# Patient Record
Sex: FEMALE | Race: WHITE | NOT HISPANIC OR LATINO | Employment: UNEMPLOYED | URBAN - METROPOLITAN AREA
[De-identification: names, ages, dates, MRNs, and addresses within clinical notes are randomized per-mention and may not be internally consistent; named-entity substitution may affect disease eponyms.]

---

## 2018-02-14 ENCOUNTER — TRANSCRIBE ORDERS (OUTPATIENT)
Dept: ADMINISTRATIVE | Facility: HOSPITAL | Age: 43
End: 2018-02-14

## 2018-02-14 DIAGNOSIS — Z12.31 VISIT FOR SCREENING MAMMOGRAM: Primary | ICD-10-CM

## 2018-02-16 ENCOUNTER — HOSPITAL ENCOUNTER (OUTPATIENT)
Dept: MAMMOGRAPHY | Facility: HOSPITAL | Age: 43
Discharge: HOME | End: 2018-02-16
Admitting: OBSTETRICS & GYNECOLOGY

## 2018-02-16 DIAGNOSIS — Z12.31 VISIT FOR SCREENING MAMMOGRAM: ICD-10-CM

## 2018-02-16 PROCEDURE — 77063 BREAST TOMOSYNTHESIS BI: CPT

## 2018-02-16 PROCEDURE — 77063 BREAST TOMOSYNTHESIS BI: CPT | Performed by: RADIOLOGY

## 2018-02-16 PROCEDURE — 77067 SCR MAMMO BI INCL CAD: CPT | Performed by: RADIOLOGY

## 2018-02-16 PROCEDURE — 77067 SCR MAMMO BI INCL CAD: CPT

## 2019-03-20 ENCOUNTER — TRANSCRIBE ORDERS (OUTPATIENT)
Dept: ADMINISTRATIVE | Facility: HOSPITAL | Age: 44
End: 2019-03-20

## 2019-03-20 DIAGNOSIS — Z12.31 VISIT FOR SCREENING MAMMOGRAM: Primary | ICD-10-CM

## 2019-06-13 ENCOUNTER — APPOINTMENT (OUTPATIENT)
Dept: MAMMOGRAPHY | Facility: HOSPITAL | Age: 44
End: 2019-06-13

## 2019-07-02 ENCOUNTER — HOSPITAL ENCOUNTER (OUTPATIENT)
Dept: MAMMOGRAPHY | Facility: HOSPITAL | Age: 44
Discharge: HOME | End: 2019-07-02
Admitting: OBSTETRICS & GYNECOLOGY

## 2019-07-02 DIAGNOSIS — Z12.31 VISIT FOR SCREENING MAMMOGRAM: ICD-10-CM

## 2019-07-02 PROCEDURE — 77067 SCR MAMMO BI INCL CAD: CPT

## 2019-07-02 PROCEDURE — 77063 BREAST TOMOSYNTHESIS BI: CPT | Performed by: RADIOLOGY

## 2019-07-02 PROCEDURE — 77067 SCR MAMMO BI INCL CAD: CPT | Performed by: RADIOLOGY

## 2019-07-02 PROCEDURE — 77063 BREAST TOMOSYNTHESIS BI: CPT

## 2019-07-10 ENCOUNTER — HOSPITAL ENCOUNTER (OUTPATIENT)
Dept: ULTRASOUND IMAGING | Facility: HOSPITAL | Age: 44
Discharge: HOME | End: 2019-07-10

## 2019-07-10 ENCOUNTER — HOSPITAL ENCOUNTER (OUTPATIENT)
Dept: MAMMOGRAPHY | Facility: HOSPITAL | Age: 44
Discharge: HOME | End: 2019-07-10

## 2019-07-10 ENCOUNTER — HOSPITAL ENCOUNTER (OUTPATIENT)
Dept: MAMMOGRAPHY | Facility: HOSPITAL | Age: 44
Discharge: HOME | End: 2019-07-10
Admitting: RADIOLOGY

## 2019-07-10 DIAGNOSIS — R92.8 ABNORMAL MAMMOGRAM: ICD-10-CM

## 2019-07-10 PROCEDURE — 19000 PUNCTURE ASPIR CYST BREAST: CPT | Performed by: RADIOLOGY

## 2019-07-10 PROCEDURE — A4648 IMPLANTABLE TISSUE MARKER: HCPCS

## 2019-07-10 PROCEDURE — G0279 TOMOSYNTHESIS, MAMMO: HCPCS

## 2019-07-10 PROCEDURE — 88360 TUMOR IMMUNOHISTOCHEM/MANUAL: CPT | Performed by: OBSTETRICS & GYNECOLOGY

## 2019-07-10 PROCEDURE — 76642 ULTRASOUND BREAST LIMITED: CPT | Performed by: RADIOLOGY

## 2019-07-10 PROCEDURE — 76942 ECHO GUIDE FOR BIOPSY: CPT | Performed by: RADIOLOGY

## 2019-07-10 PROCEDURE — 76942 ECHO GUIDE FOR BIOPSY: CPT

## 2019-07-10 PROCEDURE — 77065 DX MAMMO INCL CAD UNI: CPT

## 2019-07-10 PROCEDURE — 88305 TISSUE EXAM BY PATHOLOGIST: CPT | Performed by: OBSTETRICS & GYNECOLOGY

## 2019-07-10 PROCEDURE — 25010000003 LIDOCAINE 1 % SOLUTION: Performed by: RADIOLOGY

## 2019-07-10 PROCEDURE — 19083 BX BREAST 1ST LESION US IMAG: CPT | Performed by: RADIOLOGY

## 2019-07-10 PROCEDURE — 77061 BREAST TOMOSYNTHESIS UNI: CPT | Performed by: RADIOLOGY

## 2019-07-10 PROCEDURE — 88173 CYTOPATH EVAL FNA REPORT: CPT | Performed by: OBSTETRICS & GYNECOLOGY

## 2019-07-10 PROCEDURE — 76642 ULTRASOUND BREAST LIMITED: CPT

## 2019-07-10 PROCEDURE — 77065 DX MAMMO INCL CAD UNI: CPT | Performed by: RADIOLOGY

## 2019-07-10 RX ORDER — LIDOCAINE HYDROCHLORIDE 10 MG/ML
5 INJECTION, SOLUTION INFILTRATION; PERINEURAL ONCE
Status: COMPLETED | OUTPATIENT
Start: 2019-07-10 | End: 2019-07-10

## 2019-07-10 RX ORDER — LIDOCAINE HYDROCHLORIDE AND EPINEPHRINE 10; 10 MG/ML; UG/ML
10 INJECTION, SOLUTION INFILTRATION; PERINEURAL ONCE
Status: COMPLETED | OUTPATIENT
Start: 2019-07-10 | End: 2019-07-10

## 2019-07-10 RX ADMIN — LIDOCAINE HYDROCHLORIDE 4 ML: 10 INJECTION, SOLUTION INFILTRATION; PERINEURAL at 15:04

## 2019-07-10 RX ADMIN — LIDOCAINE HYDROCHLORIDE,EPINEPHRINE BITARTRATE 3 ML: 10; .01 INJECTION, SOLUTION INFILTRATION; PERINEURAL at 14:43

## 2019-07-10 RX ADMIN — LIDOCAINE HYDROCHLORIDE 3 ML: 10 INJECTION, SOLUTION INFILTRATION; PERINEURAL at 14:42

## 2019-07-12 ENCOUNTER — TELEPHONE (OUTPATIENT)
Dept: MAMMOGRAPHY | Facility: HOSPITAL | Age: 44
End: 2019-07-12

## 2019-07-12 LAB
LAB AP CASE REPORT: NORMAL
PATH REPORT.FINAL DX SPEC: NORMAL

## 2019-07-12 NOTE — TELEPHONE ENCOUNTER
Referring provider's office notified pathology returned as cancer & patient will be notified. Pt notified of pathology results and recommendation. Verbalizes understanding. Denies discomfort. Denies signs and symptoms of infection.     Pt desires to research surgeon choice. Pt is to call back with decision; an appointment will then be scheduled and she will be notified. Verbalizes understanding.

## 2019-07-15 ENCOUNTER — TELEPHONE (OUTPATIENT)
Dept: MAMMOGRAPHY | Facility: HOSPITAL | Age: 44
End: 2019-07-15

## 2019-07-15 NOTE — TELEPHONE ENCOUNTER
Returned pt call, requests Dr Rolle for surgical consult. Pt will be notified when an appointment is scheduled.

## 2019-07-15 NOTE — TELEPHONE ENCOUNTER
Returned pt call; notified of surgical consult appointment with Dr. Rolle on 8.14.19 @ [phone]. Told to bring photo ID, insurance cards & list of current medications. Questions answered, support given. Patient was encouraged to call back with any questions or concerns. Patient verbalized understanding.

## 2019-07-15 NOTE — TELEPHONE ENCOUNTER
Pt called in, left message; changed her surgical consult appt with Dr WEEMS to Dr Fitch Page on Monday 7.22.19 @ [phone].

## 2019-07-22 ENCOUNTER — DOCUMENTATION (OUTPATIENT)
Dept: ONCOLOGY | Facility: CLINIC | Age: 44
End: 2019-07-22

## 2019-07-23 ENCOUNTER — DOCUMENTATION (OUTPATIENT)
Dept: ONCOLOGY | Facility: CLINIC | Age: 44
End: 2019-07-23

## 2019-07-23 ENCOUNTER — APPOINTMENT (OUTPATIENT)
Dept: MAMMOGRAPHY | Facility: HOSPITAL | Age: 44
End: 2019-07-23

## 2019-07-23 DIAGNOSIS — Z17.0 MALIGNANT NEOPLASM OF OVERLAPPING SITES OF RIGHT BREAST IN FEMALE, ESTROGEN RECEPTOR POSITIVE (HCC): Primary | ICD-10-CM

## 2019-07-23 DIAGNOSIS — C50.811 MALIGNANT NEOPLASM OF OVERLAPPING SITES OF RIGHT BREAST IN FEMALE, ESTROGEN RECEPTOR POSITIVE (HCC): Primary | ICD-10-CM

## 2019-07-23 NOTE — PROGRESS NOTES
Patient offered genetic counseling. All questions concnerning the reasons and the process were discussed.  She will call back after she discusses this with her . SC

## 2019-07-23 NOTE — PROGRESS NOTES
Saw patient and  with Dr. Hurst. Path report and surgical options were discussed for her clinical stage 1 ER/CA Her 2 freddy negative cancer diagnosis. Gave and reviewed support and educational materials.  Patient will call back with her surgical decision. All questions have been answered and patient will call with any new concerns. SC

## 2019-07-25 ENCOUNTER — DOCUMENTATION (OUTPATIENT)
Dept: ONCOLOGY | Facility: CLINIC | Age: 44
End: 2019-07-25

## 2019-07-25 NOTE — PROGRESS NOTES
Patient called and states she would like to have a double mastectomy without reconstruction. Patient verbalizes understanding that she can have reconstruction at a  later date if she desires. MD office will be notified. Patient will call with any new questions or concerns. SC

## 2019-07-29 ENCOUNTER — TRANSCRIBE ORDERS (OUTPATIENT)
Dept: ADMINISTRATIVE | Facility: HOSPITAL | Age: 44
End: 2019-07-29

## 2019-07-29 ENCOUNTER — DOCUMENTATION (OUTPATIENT)
Dept: ONCOLOGY | Facility: CLINIC | Age: 44
End: 2019-07-29

## 2019-07-29 DIAGNOSIS — C50.811 MALIGNANT NEOPLASM OF OVERLAPPING SITES OF RIGHT FEMALE BREAST, UNSPECIFIED ESTROGEN RECEPTOR STATUS (HCC): Primary | ICD-10-CM

## 2019-07-29 NOTE — PROGRESS NOTES
Patient called and would like to be put on a cancellation list if possible. Her scheduled date for surgery is August 14. Office will be notified. SC

## 2019-08-01 ENCOUNTER — DOCUMENTATION (OUTPATIENT)
Dept: ONCOLOGY | Facility: CLINIC | Age: 44
End: 2019-08-01

## 2019-08-01 NOTE — PROGRESS NOTES
Patient called with questions regarding her upcoming double mastectomy. All questions were answered and she will call back with any new concerns. SC

## 2019-08-13 RX ORDER — CETIRIZINE HYDROCHLORIDE 10 MG/1
10 TABLET ORAL AS NEEDED
COMMUNITY
End: 2021-08-04

## 2019-08-14 ENCOUNTER — HOSPITAL ENCOUNTER (OUTPATIENT)
Dept: NUCLEAR MEDICINE | Facility: HOSPITAL | Age: 44
Discharge: HOME | End: 2019-08-14

## 2019-08-14 ENCOUNTER — HOSPITAL ENCOUNTER (OUTPATIENT)
Facility: HOSPITAL | Age: 44
Discharge: HOME | End: 2019-08-15
Attending: SURGERY | Admitting: SURGERY

## 2019-08-14 ENCOUNTER — ANESTHESIA (OUTPATIENT)
Dept: PERIOP | Facility: HOSPITAL | Age: 44
End: 2019-08-14

## 2019-08-14 ENCOUNTER — ANESTHESIA EVENT (OUTPATIENT)
Dept: PERIOP | Facility: HOSPITAL | Age: 44
End: 2019-08-14

## 2019-08-14 DIAGNOSIS — Z85.3 HISTORY OF RIGHT BREAST CANCER: ICD-10-CM

## 2019-08-14 DIAGNOSIS — C50.811 MALIGNANT NEOPLASM OF OVERLAPPING SITES OF RIGHT FEMALE BREAST, UNSPECIFIED ESTROGEN RECEPTOR STATUS (HCC): ICD-10-CM

## 2019-08-14 LAB
B-HCG UR QL: NEGATIVE
DEPRECATED RDW RBC AUTO: 43.5 FL (ref 37–54)
ERYTHROCYTE [DISTWIDTH] IN BLOOD BY AUTOMATED COUNT: 13.7 % (ref 12.3–15.4)
HCT VFR BLD AUTO: 39.6 % (ref 34–46.6)
HGB BLD-MCNC: 12.8 G/DL (ref 12–15.9)
INTERNAL NEGATIVE CONTROL: NEGATIVE
INTERNAL POSITIVE CONTROL: POSITIVE
Lab: NORMAL
MCH RBC QN AUTO: 28.3 PG (ref 26.6–33)
MCHC RBC AUTO-ENTMCNC: 32.3 G/DL (ref 31.5–35.7)
MCV RBC AUTO: 87.4 FL (ref 79–97)
PLATELET # BLD AUTO: 219 10*3/MM3 (ref 140–450)
PMV BLD AUTO: 10.5 FL (ref 6–12)
RBC # BLD AUTO: 4.53 10*6/MM3 (ref 3.77–5.28)
WBC NRBC COR # BLD: 5.11 10*3/MM3 (ref 3.4–10.8)

## 2019-08-14 PROCEDURE — 88307 TISSUE EXAM BY PATHOLOGIST: CPT | Performed by: SURGERY

## 2019-08-14 PROCEDURE — 25010000002 ONDANSETRON PER 1 MG: Performed by: NURSE ANESTHETIST, CERTIFIED REGISTERED

## 2019-08-14 PROCEDURE — 81025 URINE PREGNANCY TEST: CPT | Performed by: ANESTHESIOLOGY

## 2019-08-14 PROCEDURE — 25010000002 FENTANYL CITRATE (PF) 100 MCG/2ML SOLUTION: Performed by: NURSE ANESTHETIST, CERTIFIED REGISTERED

## 2019-08-14 PROCEDURE — G0378 HOSPITAL OBSERVATION PER HR: HCPCS

## 2019-08-14 PROCEDURE — A9541 TC99M SULFUR COLLOID: HCPCS | Performed by: SURGERY

## 2019-08-14 PROCEDURE — 85027 COMPLETE CBC AUTOMATED: CPT | Performed by: ANESTHESIOLOGY

## 2019-08-14 PROCEDURE — 25010000002 PROPOFOL 1000 MG/ML EMULSION: Performed by: NURSE ANESTHETIST, CERTIFIED REGISTERED

## 2019-08-14 PROCEDURE — 88341 IMHCHEM/IMCYTCHM EA ADD ANTB: CPT | Performed by: SURGERY

## 2019-08-14 PROCEDURE — 0 TECHNETIUM FILTERED SULFUR COLLOID: Performed by: SURGERY

## 2019-08-14 PROCEDURE — 38792 RA TRACER ID OF SENTINL NODE: CPT

## 2019-08-14 PROCEDURE — 25010000002 PROPOFOL 10 MG/ML EMULSION: Performed by: NURSE ANESTHETIST, CERTIFIED REGISTERED

## 2019-08-14 PROCEDURE — 25010000002 BUPRENORPHINE PER 0.1 MG: Performed by: NURSE ANESTHETIST, CERTIFIED REGISTERED

## 2019-08-14 PROCEDURE — 25010000002 DEXAMETHASONE SODIUM PHOSPHATE 10 MG/ML SOLUTION: Performed by: NURSE ANESTHETIST, CERTIFIED REGISTERED

## 2019-08-14 PROCEDURE — 25010000002 MIDAZOLAM PER 1 MG: Performed by: ANESTHESIOLOGY

## 2019-08-14 PROCEDURE — 25010000003 LIDOCAINE 1 % SOLUTION: Performed by: NURSE ANESTHETIST, CERTIFIED REGISTERED

## 2019-08-14 RX ORDER — LIDOCAINE HYDROCHLORIDE 10 MG/ML
INJECTION, SOLUTION INFILTRATION; PERINEURAL AS NEEDED
Status: DISCONTINUED | OUTPATIENT
Start: 2019-08-14 | End: 2019-08-14 | Stop reason: SURG

## 2019-08-14 RX ORDER — GLYCOPYRROLATE 0.2 MG/ML
INJECTION INTRAMUSCULAR; INTRAVENOUS AS NEEDED
Status: DISCONTINUED | OUTPATIENT
Start: 2019-08-14 | End: 2019-08-14 | Stop reason: SURG

## 2019-08-14 RX ORDER — SODIUM CHLORIDE, SODIUM LACTATE, POTASSIUM CHLORIDE, CALCIUM CHLORIDE 600; 310; 30; 20 MG/100ML; MG/100ML; MG/100ML; MG/100ML
9 INJECTION, SOLUTION INTRAVENOUS CONTINUOUS PRN
Status: DISCONTINUED | OUTPATIENT
Start: 2019-08-14 | End: 2019-08-14 | Stop reason: HOSPADM

## 2019-08-14 RX ORDER — BUPRENORPHINE HYDROCHLORIDE 0.32 MG/ML
INJECTION INTRAMUSCULAR; INTRAVENOUS
Status: COMPLETED | OUTPATIENT
Start: 2019-08-14 | End: 2019-08-14

## 2019-08-14 RX ORDER — CETIRIZINE HYDROCHLORIDE 10 MG/1
10 TABLET ORAL DAILY PRN
Status: DISCONTINUED | OUTPATIENT
Start: 2019-08-14 | End: 2019-08-15 | Stop reason: HOSPADM

## 2019-08-14 RX ORDER — DEXAMETHASONE SODIUM PHOSPHATE 10 MG/ML
INJECTION, SOLUTION INTRAMUSCULAR; INTRAVENOUS
Status: COMPLETED | OUTPATIENT
Start: 2019-08-14 | End: 2019-08-14

## 2019-08-14 RX ORDER — BUPIVACAINE HYDROCHLORIDE 2.5 MG/ML
INJECTION, SOLUTION EPIDURAL; INFILTRATION; INTRACAUDAL
Status: COMPLETED | OUTPATIENT
Start: 2019-08-14 | End: 2019-08-14

## 2019-08-14 RX ORDER — FENTANYL CITRATE 50 UG/ML
INJECTION, SOLUTION INTRAMUSCULAR; INTRAVENOUS AS NEEDED
Status: DISCONTINUED | OUTPATIENT
Start: 2019-08-14 | End: 2019-08-14 | Stop reason: SURG

## 2019-08-14 RX ORDER — PROPOFOL 10 MG/ML
VIAL (ML) INTRAVENOUS AS NEEDED
Status: DISCONTINUED | OUTPATIENT
Start: 2019-08-14 | End: 2019-08-14 | Stop reason: SURG

## 2019-08-14 RX ORDER — FENTANYL CITRATE 50 UG/ML
50 INJECTION, SOLUTION INTRAMUSCULAR; INTRAVENOUS
Status: DISCONTINUED | OUTPATIENT
Start: 2019-08-14 | End: 2019-08-14 | Stop reason: HOSPADM

## 2019-08-14 RX ORDER — SODIUM CHLORIDE 0.9 % (FLUSH) 0.9 %
3 SYRINGE (ML) INJECTION EVERY 12 HOURS SCHEDULED
Status: CANCELLED | OUTPATIENT
Start: 2019-08-14

## 2019-08-14 RX ORDER — ONDANSETRON 2 MG/ML
4 INJECTION INTRAMUSCULAR; INTRAVENOUS ONCE AS NEEDED
Status: DISCONTINUED | OUTPATIENT
Start: 2019-08-14 | End: 2019-08-14 | Stop reason: HOSPADM

## 2019-08-14 RX ORDER — MAGNESIUM HYDROXIDE 1200 MG/15ML
LIQUID ORAL AS NEEDED
Status: DISCONTINUED | OUTPATIENT
Start: 2019-08-14 | End: 2019-08-14 | Stop reason: HOSPADM

## 2019-08-14 RX ORDER — TRAMADOL HYDROCHLORIDE 50 MG/1
100 TABLET ORAL EVERY 6 HOURS PRN
Status: DISCONTINUED | OUTPATIENT
Start: 2019-08-14 | End: 2019-08-15 | Stop reason: HOSPADM

## 2019-08-14 RX ORDER — DIPHENHYDRAMINE HYDROCHLORIDE 50 MG/ML
12.5 INJECTION INTRAMUSCULAR; INTRAVENOUS EVERY 6 HOURS PRN
Status: DISCONTINUED | OUTPATIENT
Start: 2019-08-14 | End: 2019-08-15 | Stop reason: HOSPADM

## 2019-08-14 RX ORDER — FAMOTIDINE 20 MG/1
20 TABLET, FILM COATED ORAL
Status: DISCONTINUED | OUTPATIENT
Start: 2019-08-14 | End: 2019-08-14 | Stop reason: HOSPADM

## 2019-08-14 RX ORDER — MIDAZOLAM HYDROCHLORIDE 1 MG/ML
2 INJECTION INTRAMUSCULAR; INTRAVENOUS ONCE
Status: COMPLETED | OUTPATIENT
Start: 2019-08-14 | End: 2019-08-14

## 2019-08-14 RX ORDER — SCOLOPAMINE TRANSDERMAL SYSTEM 1 MG/1
PATCH, EXTENDED RELEASE TRANSDERMAL AS NEEDED
Status: DISCONTINUED | OUTPATIENT
Start: 2019-08-14 | End: 2019-08-14 | Stop reason: SURG

## 2019-08-14 RX ORDER — PROMETHAZINE HYDROCHLORIDE 25 MG/1
25 TABLET ORAL ONCE AS NEEDED
Status: DISCONTINUED | OUTPATIENT
Start: 2019-08-14 | End: 2019-08-14 | Stop reason: HOSPADM

## 2019-08-14 RX ORDER — PREGABALIN 75 MG/1
75 CAPSULE ORAL ONCE
Status: COMPLETED | OUTPATIENT
Start: 2019-08-14 | End: 2019-08-14

## 2019-08-14 RX ORDER — CELECOXIB 200 MG/1
200 CAPSULE ORAL ONCE
Status: COMPLETED | OUTPATIENT
Start: 2019-08-14 | End: 2019-08-14

## 2019-08-14 RX ORDER — MEPERIDINE HYDROCHLORIDE 25 MG/ML
12.5 INJECTION INTRAMUSCULAR; INTRAVENOUS; SUBCUTANEOUS
Status: DISCONTINUED | OUTPATIENT
Start: 2019-08-14 | End: 2019-08-14 | Stop reason: HOSPADM

## 2019-08-14 RX ORDER — PROMETHAZINE HYDROCHLORIDE 25 MG/1
25 SUPPOSITORY RECTAL ONCE AS NEEDED
Status: DISCONTINUED | OUTPATIENT
Start: 2019-08-14 | End: 2019-08-14 | Stop reason: HOSPADM

## 2019-08-14 RX ORDER — HYDROMORPHONE HYDROCHLORIDE 1 MG/ML
0.5 INJECTION, SOLUTION INTRAMUSCULAR; INTRAVENOUS; SUBCUTANEOUS
Status: DISCONTINUED | OUTPATIENT
Start: 2019-08-14 | End: 2019-08-14 | Stop reason: HOSPADM

## 2019-08-14 RX ORDER — PROMETHAZINE HYDROCHLORIDE 25 MG/ML
12.5 INJECTION, SOLUTION INTRAMUSCULAR; INTRAVENOUS ONCE AS NEEDED
Status: DISCONTINUED | OUTPATIENT
Start: 2019-08-14 | End: 2019-08-14 | Stop reason: HOSPADM

## 2019-08-14 RX ORDER — LIDOCAINE HYDROCHLORIDE 10 MG/ML
0.5 INJECTION, SOLUTION EPIDURAL; INFILTRATION; INTRACAUDAL; PERINEURAL ONCE AS NEEDED
Status: COMPLETED | OUTPATIENT
Start: 2019-08-14 | End: 2019-08-14

## 2019-08-14 RX ORDER — ESMOLOL HYDROCHLORIDE 10 MG/ML
INJECTION INTRAVENOUS AS NEEDED
Status: DISCONTINUED | OUTPATIENT
Start: 2019-08-14 | End: 2019-08-14 | Stop reason: SURG

## 2019-08-14 RX ORDER — HYDROCODONE BITARTRATE AND ACETAMINOPHEN 7.5; 325 MG/1; MG/1
1 TABLET ORAL ONCE AS NEEDED
Status: DISCONTINUED | OUTPATIENT
Start: 2019-08-14 | End: 2019-08-14 | Stop reason: HOSPADM

## 2019-08-14 RX ORDER — ACETAMINOPHEN 500 MG
1000 TABLET ORAL ONCE
Status: COMPLETED | OUTPATIENT
Start: 2019-08-14 | End: 2019-08-14

## 2019-08-14 RX ORDER — SODIUM CHLORIDE 9 MG/ML
50 INJECTION, SOLUTION INTRAVENOUS CONTINUOUS
Status: DISCONTINUED | OUTPATIENT
Start: 2019-08-14 | End: 2019-08-15 | Stop reason: HOSPADM

## 2019-08-14 RX ORDER — ATRACURIUM BESYLATE 10 MG/ML
INJECTION, SOLUTION INTRAVENOUS AS NEEDED
Status: DISCONTINUED | OUTPATIENT
Start: 2019-08-14 | End: 2019-08-14 | Stop reason: SURG

## 2019-08-14 RX ORDER — OXYCODONE HYDROCHLORIDE 5 MG/1
5 TABLET ORAL EVERY 4 HOURS PRN
Status: DISCONTINUED | OUTPATIENT
Start: 2019-08-14 | End: 2019-08-15 | Stop reason: HOSPADM

## 2019-08-14 RX ORDER — IBUPROFEN 400 MG/1
400 TABLET ORAL EVERY 6 HOURS PRN
Status: DISCONTINUED | OUTPATIENT
Start: 2019-08-14 | End: 2019-08-15 | Stop reason: HOSPADM

## 2019-08-14 RX ORDER — PROMETHAZINE HYDROCHLORIDE 25 MG/ML
6.25 INJECTION, SOLUTION INTRAMUSCULAR; INTRAVENOUS EVERY 6 HOURS PRN
Status: DISCONTINUED | OUTPATIENT
Start: 2019-08-14 | End: 2019-08-15 | Stop reason: HOSPADM

## 2019-08-14 RX ORDER — ONDANSETRON 2 MG/ML
INJECTION INTRAMUSCULAR; INTRAVENOUS AS NEEDED
Status: DISCONTINUED | OUTPATIENT
Start: 2019-08-14 | End: 2019-08-14 | Stop reason: SURG

## 2019-08-14 RX ORDER — SODIUM CHLORIDE 0.9 % (FLUSH) 0.9 %
1-10 SYRINGE (ML) INJECTION AS NEEDED
Status: CANCELLED | OUTPATIENT
Start: 2019-08-14

## 2019-08-14 RX ORDER — ONDANSETRON 2 MG/ML
4 INJECTION INTRAMUSCULAR; INTRAVENOUS EVERY 6 HOURS PRN
Status: DISCONTINUED | OUTPATIENT
Start: 2019-08-14 | End: 2019-08-15 | Stop reason: HOSPADM

## 2019-08-14 RX ORDER — NEOSTIGMINE METHYLSULFATE 5 MG/5 ML
SYRINGE (ML) INTRAVENOUS AS NEEDED
Status: DISCONTINUED | OUTPATIENT
Start: 2019-08-14 | End: 2019-08-14 | Stop reason: SURG

## 2019-08-14 RX ADMIN — LIDOCAINE HYDROCHLORIDE 40 MG: 10 INJECTION, SOLUTION INFILTRATION; PERINEURAL at 09:54

## 2019-08-14 RX ADMIN — PROPOFOL 130 MG: 10 INJECTION, EMULSION INTRAVENOUS at 09:54

## 2019-08-14 RX ADMIN — LIDOCAINE HYDROCHLORIDE 0.2 ML: 10 INJECTION, SOLUTION EPIDURAL; INFILTRATION; INTRACAUDAL; PERINEURAL at 08:20

## 2019-08-14 RX ADMIN — SODIUM CHLORIDE, POTASSIUM CHLORIDE, SODIUM LACTATE AND CALCIUM CHLORIDE: 600; 310; 30; 20 INJECTION, SOLUTION INTRAVENOUS at 11:17

## 2019-08-14 RX ADMIN — ONDANSETRON 4 MG: 2 INJECTION INTRAMUSCULAR; INTRAVENOUS at 11:31

## 2019-08-14 RX ADMIN — CELECOXIB 200 MG: 200 CAPSULE ORAL at 09:42

## 2019-08-14 RX ADMIN — ACETAMINOPHEN 1000 MG: 500 TABLET ORAL at 09:42

## 2019-08-14 RX ADMIN — Medication 2 MG: at 11:35

## 2019-08-14 RX ADMIN — DEXAMETHASONE SODIUM PHOSPHATE 4 MG: 10 INJECTION INTRAMUSCULAR; INTRAVENOUS at 10:03

## 2019-08-14 RX ADMIN — DEXAMETHASONE SODIUM PHOSPHATE 6 MG: 10 INJECTION INTRAMUSCULAR; INTRAVENOUS at 10:16

## 2019-08-14 RX ADMIN — PREGABALIN 75 MG: 75 CAPSULE ORAL at 09:42

## 2019-08-14 RX ADMIN — TECHNETIUM TC 99M SULFUR COLLOID 1 DOSE: KIT at 10:05

## 2019-08-14 RX ADMIN — ATRACURIUM BESYLATE 25 MG: 10 INJECTION, SOLUTION INTRAVENOUS at 09:54

## 2019-08-14 RX ADMIN — TRAMADOL HYDROCHLORIDE 100 MG: 50 TABLET, FILM COATED ORAL at 15:06

## 2019-08-14 RX ADMIN — OXYCODONE HYDROCHLORIDE 5 MG: 5 TABLET ORAL at 20:53

## 2019-08-14 RX ADMIN — SCOPALAMINE 1 PATCH: 1 PATCH, EXTENDED RELEASE TRANSDERMAL at 10:09

## 2019-08-14 RX ADMIN — GLYCOPYRROLATE 0.4 MG: 0.2 INJECTION, SOLUTION INTRAMUSCULAR; INTRAVENOUS at 11:35

## 2019-08-14 RX ADMIN — SODIUM CHLORIDE, POTASSIUM CHLORIDE, SODIUM LACTATE AND CALCIUM CHLORIDE 9 ML/HR: 600; 310; 30; 20 INJECTION, SOLUTION INTRAVENOUS at 08:20

## 2019-08-14 RX ADMIN — BUPRENORPHINE HYDROCHLORIDE 0.3 MG: 0.32 INJECTION INTRAMUSCULAR; INTRAVENOUS at 10:03

## 2019-08-14 RX ADMIN — FENTANYL CITRATE 50 MCG: 0.05 INJECTION, SOLUTION INTRAMUSCULAR; INTRAVENOUS at 13:44

## 2019-08-14 RX ADMIN — ESMOLOL HYDROCHLORIDE 40 MG: 10 INJECTION INTRAVENOUS at 09:54

## 2019-08-14 RX ADMIN — MIDAZOLAM HYDROCHLORIDE 2 MG: 1 INJECTION, SOLUTION INTRAMUSCULAR; INTRAVENOUS at 08:45

## 2019-08-14 RX ADMIN — FAMOTIDINE 20 MG: 20 TABLET ORAL at 08:37

## 2019-08-14 RX ADMIN — PROPOFOL 200 MCG/KG/MIN: 10 INJECTION, EMULSION INTRAVENOUS at 09:54

## 2019-08-14 RX ADMIN — SODIUM CHLORIDE 50 ML/HR: 9 INJECTION, SOLUTION INTRAVENOUS at 15:41

## 2019-08-14 RX ADMIN — BUPIVACAINE HYDROCHLORIDE 54 ML: 2.5 INJECTION, SOLUTION EPIDURAL; INFILTRATION; INTRACAUDAL; PERINEURAL at 10:03

## 2019-08-14 RX ADMIN — OXYCODONE HYDROCHLORIDE 5 MG: 5 TABLET ORAL at 16:09

## 2019-08-14 RX ADMIN — FENTANYL CITRATE 50 MCG: 50 INJECTION, SOLUTION INTRAMUSCULAR; INTRAVENOUS at 10:55

## 2019-08-14 NOTE — PROGRESS NOTES
"GEN SURG PROGRESS NOTE     LOS: 0 days   Patient Care Team:  Cordelia Rinocn MD as PCP - General (Internal Medicine)    Chief Complaint:    Subjective     Interval History:     Patient Complaints: Reasonably comfortable  Patient Denies:    History taken from: patient    Review of Systems:        Objective     Vital Signs  /67 (BP Location: Right leg)   Pulse 70   Temp 98 °F (36.7 °C) (Oral)   Resp 18   Ht 170.2 cm (67\")   Wt 63.5 kg (140 lb)   LMP 07/29/2019   SpO2 99%   Breastfeeding? No   BMI 21.93 kg/m²     Physical Exam:   Dressings dry.  Flaps flat and viable.  HELEN O as expected     Results Review:     I reviewed the patient's new clinical results.  Labs  Lab Results (last 72 hours)     Procedure Component Value Units Date/Time    Tissue Pathology Exam [[id number]] Collected:  08/14/19 1035    Specimen:  Tissue from Breast, Left; Tissue from Breast, Right; Tissue from [place] Lymph Node Updated:  08/14/19 1338    CBC (No Diff) [[id number]]  (Normal) Collected:  08/14/19 0835    Specimen:  Blood Updated:  08/14/19 0852     WBC 5.11 10*3/mm3      RBC 4.53 10*6/mm3      Hemoglobin 12.8 g/dL      Hematocrit 39.6 %      MCV 87.4 fL      MCH 28.3 pg      MCHC 32.3 g/dL      RDW 13.7 %      RDW-SD 43.5 fl      MPV 10.5 fL      Platelets 219 10*3/mm3     POC Pregnancy, Urine [[id number]]  (Normal) Collected:  08/14/19 0838    Specimen:  Urine Updated:  08/14/19 0839     HCG, Urine, QL Negative     Lot Number [id number]     Internal Positive Control Positive     Internal Negative Control Negative          Rads  Imaging Results (last 72 hours)     ** No results found for the last 72 hours. **          Assessment/Plan    Stable postop      History of right breast cancer        Constantine Hurst MD  08/14/19  4:06 PM        "

## 2019-08-14 NOTE — BRIEF OP NOTE
BREAST MASTECTOMY WITH SENTINEL NODE BIOPSY  Progress Note    Belinda Smallwood  8/14/2019    Pre-op Diagnosis:   Carcinoma of the right breast       Post-Op Diagnosis Codes:   same    Procedure/CPT® Codes:      Procedure(s):  BILATERAL BREAST MASTECTOMY WITH RIGHT SENTINEL NODE BIOPSY    Surgeon(s):  Constantine Hurst MD    Anesthesia: General    Staff:   Circulator: Codi Agarwal, RN  Scrub Person: Karen Evans; Becky Webster; Maryam Segovia  Assistant: Ebony Martínez PA-C    Estimated Blood Loss: 50 mL    Urine Voided: * No values recorded between 8/14/2019  9:50 AM and 8/14/2019 11:43 AM *    Specimens:                Specimens     ID Source Type Tests Collected By Collected At Frozen?      A Breast, Left Tissue · TISSUE PATHOLOGY EXAM   Constantine Hurst MD 8/14/19 1035 No     Description: LEFT BREAST    B Breast, Right Tissue · TISSUE PATHOLOGY EXAM   Constantine Hurst MD 8/14/19 1057 No     Description: RIGHT BREAST STITCH LONG LAT, SHORT SUP    C [facility] Lymph Node Tissue · TISSUE PATHOLOGY EXAM   Constantine Hurst MD 8/14/19 1117 No     Description: RIGHT SENTINEL NODES                Drains:   Closed/Suction Drain 1 Left Breast Bulb 10 Fr. (Active)       Closed/Suction Drain 2 Right Breast Bulb 10 Fr. (Active)       Closed/Suction Drain 3 Right Breast Bulb 10 Fr. (Active)       Findings: one large axillary sentinel node;several smaller nodes    Complications: none      Constantine Hurst MD     Date: 8/14/2019  Time: 11:43 AM

## 2019-08-14 NOTE — ANESTHESIA PROCEDURE NOTES
Peripheral Block      Patient reassessed immediately prior to procedure    Patient location during procedure: OR  Reason for block: at surgeon's request and post-op pain management  Performed by  Anesthesiologist: Donavan Jung MD  Assisted by: Ha Villanueva MD  Preanesthetic Checklist  Completed: patient identified, site marked, surgical consent, pre-op evaluation, timeout performed, IV checked, risks and benefits discussed and monitors and equipment checked  Prep:  Pt Position: supine  Sterile barriers:cap, gloves, gown and mask  Prep: ChloraPrep  Patient monitoring: blood pressure monitoring, continuous pulse oximetry and EKG  Procedure  Performed under: general  Guidance:ultrasound guided and landmark technique  Images:still images not obtained    Laterality:Bilateral  Block Type:PECS I and PECS II  Injection Technique:single-shot  Needle Type:short-bevel  Needle Gauge:20 G  Resistance on Injection: none    Medications Used: buprenorphine (BUPRENEX) injection, 0.3 mg  dexamethasone sodium phosphate injection, 4 mg  bupivacaine PF (MARCAINE) 0.25 % injection, 54 mL  Med admintered at 8/14/2019 10:03 AM      Medications  Preservative Free Saline:10ml  Comment:Block Injection:  Total volume of LA divided between Right and Left sided blocks         Post Assessment  Injection Assessment: negative aspiration for heme and incremental injection  Patient Tolerance:comfortable throughout block  Complications:no  Additional Notes  The pt. Was placed in the Supine Position and GA was induced     The insertion site was prepped with CHG and Ultrasound guidance with In-Plane techniquewas  a 4inch BBraun 360 degree echogenic needle was visualized.  Normal Saline PSF was  utilized for hydrodissection of tissue. PECS 1 Block- Pectoralis Major and Minor where identified and LA was injected between PMM and PmM at the level of the 3rd Rib(10ml),  PECS 2-  Pectoralis Minor and Serratus muscle where identified and the needle  was advanced laterally in-plane with the 4th rib as a backstop, pleura was monitored.  LA was injected between SA and PmM at the level of 4th rib( 20ml).  LA injection spread was visualized, injection was incremental 1-5ml, normal or low injection pressure, no intravascular injection, no pneumothorax appreciated.  Thank You.

## 2019-08-14 NOTE — OP NOTE
BREAST MASTECTOMY WITH SENTINEL NODE BIOPSY  Procedure Note    Belinda Smallwood  Date of Surgery:  8/14/2019    Pre-op Diagnosis:   Carcinoma of the right breast    Post-op Diagnosis:     Same    Operative Procedure:   Bilateral simple mastectomies; right axillary sentinel node biopsy    Indications:  Ms. Smallwood is a 44-year-old female with biopsy-proven invasive ductal carcinoma of the right breast.  After consideration of her options she has chosen bilateral mastectomies without reconstruction.  She will also need a right axillary sentinel node biopsy.    Operative note:  On 8/14/2019, the patient was taken to the operating room and placed supine on the operating table.  Following adequate induction of general endotracheal anesthesia, the right arm and both sides of the chest were prepped and draped in the standard fashion.  Prior to prepping and draping, approximately 530 µCi of technetium sulfur colloid solution was injected dermally around the right areola.  Skin lines were then marked out on each breast to include the nipple areolar complex.  The unaffected left side was approached first by incising the skin.  Skin flaps were raised circumferentially around the breast.  These were of adequate thickness and vascularity.  No gross abnormality was encountered.  The left breast was then amputated off the pectoralis muscle, to include the pectoralis fascia, with the cautery.  The breast was passed off the table.  The left chest was then irrigated with sterile water.  hemostasis was excellent.  A 10 mm flat HELEN drain was brought from an inferior stab wound and directed across the pectoralis.  This was sutured to the skin with 3-0 nylon.  The mastectomy wound was then closed with interrupted 3-0 Vicryl in a running 4-0 Vicryl subcuticular suture.  The drain was placed to bulb suction and this area covered with a sterile towel.  Mirror-image incisions on the right were then incised.  Again, skin flaps were raised  "circumferentially around the breast.  No gross tumor was encountered.  The right breast was then amputated off the pectoralis muscle, to include the pectoralis fascia, with cautery.  The breast was then marked for pathology and passed off the table.  The neoprobe was brought into the field and scanning revealed uptake in the lower and anterior portion of the axilla.  Careful dissection revealed a modest size node initially.  This was completely dissected free.  Ex vivo scanning confirmed that this was a \"hot\" node.  Least 3-4 other small nodes were encountered somewhat inferior to this along the lateral chest wall.  These were all separately excised and all demonstrated ex vivo radioactivity using the neoprobe.  Finally, repeat scanning with the neoprobe revealed no further radioactivity in the axilla.  There was certainly no evidence of any gross sangeeta disease save for the initial medium size node found with the neoprobe.  The chest wall and axilla were then irrigated with sterile water.  Hemostasis was excellent.  210 mm flat HELEN drains were brought from inferior stab wounds.  One was directed across the pectoralis; the other directed into the axilla.  Both of these were sutured to the skin with 3-0 nylon.  The subcutaneous tissue was then reapproximated with interrupted 3-0 Vicryl, and the skin closed with a running 4-0 Vicryl subcuticular suture.  Sterile dressings were applied in the operating room and the drains placed to bulb suction.  The patient tolerated the procedure well and there were no complications.  Estimated blood loss is approximately 20 to 25 mL's.  Both preoperative and postoperative sponge and needle counts were correct.  The patient was taken to the recovery room in satisfactory condition.    Constantine Hurst MD     Date: 8/14/2019  Time: 11:57 AM        "

## 2019-08-14 NOTE — ANESTHESIA PROCEDURE NOTES
Airway  Urgency: elective    Airway not difficult    General Information and Staff    Patient location during procedure: OR  CRNA: Chanel Warner CRNA    Indications and Patient Condition  Indications for airway management: airway protection    Preoxygenated: yes  MILS not maintained throughout  Mask difficulty assessment: 1 - vent by mask    Final Airway Details  Final airway type: endotracheal airway      Successful airway: ETT  Cuffed: yes   Successful intubation technique: direct laryngoscopy  Endotracheal tube insertion site: oral  Blade: Celia  Blade size: 3  ETT size (mm): 7.0  Cormack-Lehane Classification: grade I - full view of glottis  Placement verified by: chest auscultation and capnometry   Measured from: lips  ETT to lips (cm): 20  Number of attempts at approach: 1    Additional Comments  Negative epigastric sounds, Breath sound equal bilaterally with symmetric chest rise and fall

## 2019-08-14 NOTE — INTERVAL H&P NOTE
"Pre-Op H&P (See Recent Office Note Attached for Full H&P)    Chief complaint: Breast cancer    HPI:    Patient is a 44 y.o. female who presents with stage I invasive ductal carcinoma of the right breast, ER+, MI+, HER-2/freddy negative.  Initial presentation was for abnormal mammography.  Current diagnosis was determined by mammography, breast ultrasound, core needle biopsy.      She presents today for a bilateral breast mastectomy with right sentinel node biopsy    Review of Systems:  General ROS:  no fever, chills, rashes, No change since last office visit  Cardiovascular ROS: no chest pain or dyspnea on exertion  Respiratory ROS: no cough, shortness of breath, or wheezing    Meds:    No current facility-administered medications on file prior to encounter.      Current Outpatient Medications on File Prior to Encounter   Medication Sig Dispense Refill   • cetirizine (zyrTEC) 10 MG tablet Take 10 mg by mouth As Needed for Allergies.     • Cholecalciferol (VITAMIN D PO) Take  by mouth.     • Multiple Vitamins-Minerals (MULTIVITAMIN ADULT PO) Take 1 tablet by mouth Daily.         Vital Signs:  BP 96/73 (BP Location: Right arm, Patient Position: Sitting)   Pulse 67   Temp 98.3 °F (36.8 °C) (Temporal)   Resp 18   Ht 170.2 cm (67\")   Wt 63.5 kg (140 lb)   LMP 07/29/2019   SpO2 99%   Breastfeeding? No   BMI 21.93 kg/m²     Physical Exam:    CV:  S1S2 regular rate and rhythm, no murmur               Resp:  Clear to auscultation; respirations regular, even and unlabored    Results Review:     Lab Results   Component Value Date    WBC 5.11 08/14/2019    HGB 12.8 08/14/2019    HCT 39.6 08/14/2019    MCV 87.4 08/14/2019     08/14/2019        I reviewed the patient's new clinical results.    Cancer Staging (if applicable)  Cancer Patient: _x_ yes __no __unknown; If yes, clinical stage T:__ N:__M:__, stage group or __N/A    Assessment:  Stage I invasive ductal carcinoma of right breast, ER+, MI+, HER-2/freddy " negative    Plan:  Bilateral breast mastectomy with right sentinel node biopsy    Swathi Justice PA-C  8/14/2019   9:02 AM

## 2019-08-14 NOTE — ANESTHESIA PREPROCEDURE EVALUATION
Anesthesia Evaluation     Patient summary reviewed and Nursing notes reviewed   NPO Solid Status: > 8 hours  NPO Liquid Status: > 8 hours           Airway   Mallampati: I  TM distance: >3 FB  Neck ROM: full  No difficulty expected  Dental      Pulmonary    (-) COPD, asthma, shortness of breath, recent URI, sleep apnea, not a smoker  Cardiovascular     (-) hypertension, past MI, dysrhythmias, angina, hyperlipidemia      Neuro/Psych  (-) seizures, CVA  GI/Hepatic/Renal/Endo    (-) liver disease, no renal disease, diabetes, hypothyroidism    Musculoskeletal     Abdominal    Substance History      OB/GYN          Other      history of cancer (breast )                  Anesthesia Plan    ASA 1     general with block   (PECs  blocks , Propofol infusion,  Opiate sparing  )  intravenous induction   Anesthetic plan, all risks, benefits, and alternatives have been provided, discussed and informed consent has been obtained with: patient.    Plan discussed with CRNA.

## 2019-08-14 NOTE — ANESTHESIA POSTPROCEDURE EVALUATION
Patient: Belinda Smallwood    Procedure Summary     Date:  08/14/19 Room / Location:   [facility]    Anesthesia Start:  0950 Anesthesia Stop:  1205    Procedure:  BILATERAL BREAST MASTECTOMY WITH RIGHT SENTINEL NODE BIOPSY (Bilateral Breast) Diagnosis:      Surgeon:  Constantine Hurst MD Provider:  Donavan Jung MD    Anesthesia Type:  general with block ASA Status:  1          Anesthesia Type: general with block  Last vitals  BP   90/47 (08/14/19 1205)   Temp   97 °F (36.1 °C) (08/14/19 1205)   Pulse   60 (08/14/19 1205)   Resp   16 (08/14/19 1205)     SpO2   99 % (08/14/19 1205)     Post Anesthesia Care and Evaluation    Patient location during evaluation: PACU  Patient participation: waiting for patient participation  Level of consciousness: sleepy but conscious  Pain score: 0  Pain management: adequate  Airway patency: patent  Anesthetic complications: No anesthetic complications  PONV Status: none  Cardiovascular status: hemodynamically stable and acceptable  Respiratory status: nonlabored ventilation, acceptable, nasal cannula and oral airway  Hydration status: acceptable

## 2019-08-15 VITALS
TEMPERATURE: 97.8 F | DIASTOLIC BLOOD PRESSURE: 52 MMHG | WEIGHT: 140 LBS | HEART RATE: 63 BPM | RESPIRATION RATE: 16 BRPM | OXYGEN SATURATION: 98 % | BODY MASS INDEX: 21.97 KG/M2 | SYSTOLIC BLOOD PRESSURE: 86 MMHG | HEIGHT: 67 IN

## 2019-08-15 PROCEDURE — G0378 HOSPITAL OBSERVATION PER HR: HCPCS

## 2019-08-15 RX ORDER — OXYCODONE HYDROCHLORIDE 5 MG/1
5 TABLET ORAL EVERY 6 HOURS PRN
Qty: 16 TABLET | Refills: 0 | Status: SHIPPED | OUTPATIENT
Start: 2019-08-15 | End: 2019-08-24

## 2019-08-15 RX ADMIN — OXYCODONE HYDROCHLORIDE 5 MG: 5 TABLET ORAL at 10:15

## 2019-08-15 RX ADMIN — OXYCODONE HYDROCHLORIDE 5 MG: 5 TABLET ORAL at 01:54

## 2019-08-15 RX ADMIN — IBUPROFEN 400 MG: 400 TABLET ORAL at 06:22

## 2019-08-15 NOTE — PLAN OF CARE
Problem: Patient Care Overview  Goal: Plan of Care Review  Outcome: Ongoing (interventions implemented as appropriate)   08/14/19 0831 08/15/19 0300   OTHER   Outcome Summary --  VSS. Pain controlled with PRN oral pain meds. Ambulated in the [place] tonight. Resting well. Will continue to monitor.   Coping/Psychosocial   Plan of Care Reviewed With patient --      Goal: Individualization and Mutuality  Outcome: Ongoing (interventions implemented as appropriate)    Goal: Discharge Needs Assessment  Outcome: Ongoing (interventions implemented as appropriate)    Goal: Interprofessional Rounds/Family Conf  Outcome: Ongoing (interventions implemented as appropriate)      Problem: Breast Surgery/Reconstruction (Adult)  Goal: Signs and Symptoms of Listed Potential Problems Will be Absent, Minimized or Managed (Breast Surgery/Reconstruction)  Outcome: Ongoing (interventions implemented as appropriate)    Goal: Anesthesia/Sedation Recovery  Outcome: Ongoing (interventions implemented as appropriate)

## 2019-08-15 NOTE — PROGRESS NOTES
"Date of Discharge:  8/15/2019    Discharge Diagnosis:   Carcinoma of the right breast    Problem List:    History of right breast cancer      Presenting Problem/History of Present Illness  Malignant neoplasm of overlapping sites of right female breast [C50.811]  History of right breast cancer [Z85.3]        Hospital Course  Patient is a 44 y.o. female presented with invasive ductal carcinoma the right breast.  She presented with a clinical T1 lesion.  After discussion of her options she has chosen bilateral mastectomy without reconstruction.  On 814, she underwent bilateral simple mastectomy with right axillary sentinel node biopsy.  No gross abnormalities were noted at the time of surgery.  Her postoperative course has been unremarkable.  Her pain has been well controlled and her HELEN output has been as expected.  On the date of discharge she is afebrile and in satisfactory condition.  Her flaps are flat and viable, and her HELEN output is mostly serous.  She is discharged home with wound care and activity limitations thoroughly discussed with her.  She and her family will also be taught drain care management and output measurement.  She will resume her medications as at home.  She will also be sent home with a prescription for Percocet for pain.  She will follow-up in my office on 8/20/2019..      Procedures Performed  Procedure(s):  BILATERAL BREAST MASTECTOMY WITH RIGHT SENTINEL NODE BIOPSY       Consults:   Consults     No orders found for last 30 day(s).          Pertinent Test Results:       Vital Signs  BP (!) 86/52 (BP Location: Left arm, Patient Position: Lying)   Pulse 63   Temp 97.8 °F (36.6 °C) (Oral)   Resp 16   Ht 170.2 cm (67\")   Wt 63.5 kg (140 lb)   LMP 07/29/2019   SpO2 98%   Breastfeeding? No   BMI 21.93 kg/m²     Discharge Disposition  Where: home      Discharge Medications     Discharge Medications      ASK your doctor about these medications      Instructions Start Date   cetirizine 10 MG " tablet  Commonly known as:  zyrTEC   10 mg, Oral, As Needed      MULTIVITAMIN ADULT PO   1 tablet, Oral, Daily      VITAMIN D PO   Oral             Discharge Diet   Regular    Activity at Discharge  Ad pola.    Follow-up Appointments  Dr. hurst on 8/20/2019      Test Results Pending at Discharge  Final pathology      Constantine Hurst MD  08/15/19  7:54 AM

## 2019-09-30 ENCOUNTER — CONSULT (OUTPATIENT)
Dept: ONCOLOGY | Facility: CLINIC | Age: 44
End: 2019-09-30

## 2019-09-30 VITALS
WEIGHT: 133 LBS | HEIGHT: 67 IN | OXYGEN SATURATION: 100 % | DIASTOLIC BLOOD PRESSURE: 64 MMHG | RESPIRATION RATE: 16 BRPM | BODY MASS INDEX: 20.88 KG/M2 | TEMPERATURE: 97.5 F | SYSTOLIC BLOOD PRESSURE: 99 MMHG | HEART RATE: 66 BPM

## 2019-09-30 DIAGNOSIS — C50.411 PRIMARY MALIGNANT NEOPLASM OF UPPER OUTER QUADRANT OF FEMALE BREAST, RIGHT (HCC): Primary | ICD-10-CM

## 2019-09-30 PROCEDURE — 99205 OFFICE O/P NEW HI 60 MIN: CPT | Performed by: INTERNAL MEDICINE

## 2019-09-30 RX ORDER — HYDROXYZINE 50 MG/1
50 TABLET, FILM COATED ORAL 3 TIMES DAILY PRN
COMMUNITY
End: 2019-10-21

## 2019-09-30 NOTE — PROGRESS NOTES
ID: 44 y.o. year old female from [facility] [id number]    PCP: Cordelia Rincon MD    REFERRING PHYSICIAN: Constantine PLEITEZ MD    Reason for Consultation: Stage I ER NM positive HER-2 negative right breast cancer    Dear Dr. Hurst    It is a pleasure to meet Mrs. Smallwood today.  She is a very pleasant 44-year-old lady that I am seeing in consultation due to a recently resected stage I ER NM positive HER-2 negative right breast cancer.  This was intermediate grade malignancy.  No lymphovascular invasion was noted.  She had 7 lymph nodes removed which were negative for metastatic disease.  Tumor measured 1.4 cm in its largest dimension.  She has a considerable family history of breast cancer involving her mother and grandmother.  Her mother actually is having issues with metastatic disease at this time.  Patient reports her mother was tested for BRCA positivity and was negative.  She presents today to discuss adjuvant therapy going forward.  She had bilateral mastectomies.  She does have menstrual cycles.  So she is premenopausal.        Past Medical History:   Diagnosis Date   • Cancer (CMS/HCC)     Breast   • Eczema    • Lyme disease 2017    treated with abx   • Primary malignant neoplasm of upper outer quadrant of female breast, right (CMS/HCC)        Past Surgical History:   Procedure Laterality Date   • BREAST BIOPSY  07/2010   • BREAST EXCISIONAL BIOPSY Right     1996?   • MASTECTOMY W/ SENTINEL NODE BIOPSY Bilateral 8/14/2019    Procedure: BREAST MASTECTOMY BILATERAL WITH RIGHT SENTINEL NODE BIOPSY;  Surgeon: Constantine Hurst MD;  Location: [facility];  Service: General   • SHOULDER ARTHROSCOPY Right 1992   • US GUIDED CYST ASPIRATION BREAST N/A 7/10/2019   • WISDOM TOOTH EXTRACTION         Social History     Socioeconomic History   • Marital status:      Spouse name: Not on file   • Number of children: Not on file   • Years of education: Not on file   • Highest education level: Not on file   Tobacco  Use   • Smoking status: Never Smoker   • Smokeless tobacco: Never Used   Substance and Sexual Activity   • Alcohol use: No     Frequency: Never   • Drug use: No   • Sexual activity: Defer       Family History   Problem Relation Age of Onset   • Breast cancer Mother 65   • Thyroid disease Mother    • Lung cancer Mother    • Breast cancer Maternal Grandmother         unknown onset   • Ovarian cancer Neg Hx        Review of Systems:    16 point review of systems was performed and reviewed and scanned into the EMR    Review of Systems - Oncology      Current Outpatient Medications:   •  cetirizine (zyrTEC) 10 MG tablet, Take 10 mg by mouth As Needed for Allergies., Disp: , Rfl:   •  Cholecalciferol (VITAMIN D PO), Take  by mouth., Disp: , Rfl:   •  hydrOXYzine (ATARAX) 50 MG tablet, Take 50 mg by mouth 3 (Three) Times a Day As Needed for Itching., Disp: , Rfl:   •  Multiple Vitamins-Minerals (MULTIVITAMIN ADULT PO), Take 1 tablet by mouth Daily., Disp: , Rfl:   •  oxyCODONE-acetaminophen (PERCOCET) 5-325 MG per tablet, Take 1 tablet by mouth Every 6 (Six) Hours As Needed for pain, Disp: 15 tablet, Rfl: 0    No Known Allergies    ECOG SCORE: 0    Objective     Vitals:    09/30/19 1118   BP: 99/64   Pulse: 66   Resp: 16   Temp: 97.5 °F (36.4 °C)   SpO2: 100%     Body mass index is 21.15 kg/m².  Body surface area is 1.69 meters squared.        09/30/19  1118   Weight: 60.3 kg (133 lb)         Physical Exam    General: well appearing, in no acute distress  HEENT: sclera anicteric, oropharynx clear, neck is supple  Lymphatics: no cervical, supraclavicular, or axillary adenopathy  Cardiovascular: regular rate and rhythm, no murmurs, rubs or gallops  Lungs: clear to auscultation bilaterally  Abdomen: soft, nontender, nondistended.  No palpable organomegaly  Extremities: no lower extremity edema  Skin: no rashes, lesions, bruising, or petechiae  Msk:  Shows no weakness of the large muscle groups  Psych: Mood is stable      No  results found for: GLUCOSE, BUN, CREATININE, NA, K, CL, CO2, CALCIUM, PROTEINTOT, ALBUMIN, BILITOT, ALKPHOS, AST, ALT    Lab Results   Component Value Date    HGB 12.8 08/14/2019    HCT 39.6 08/14/2019    MCV 87.4 08/14/2019     08/14/2019    WBC 5.11 08/14/2019       Lab Results   Component Value Date    FINALDX  08/14/2019      1. LEFT BREAST, SIMPLE MASTECTOMY:  Fibrocystic change with focal florid epithelial hyperplasia, no atypia, in-situ or invasive carcinoma.   2. RIGHT BREAST, SIMPLE MASTECTOMY:  Infiltrating and in-situ intermediate grade ductal adenocarcinoma, tumor size 1.4 x 1.4 x 0.3 cm.  Haro-Ashford score 6/9.  Margins of excision negative for tumor.  Posterior margin closest to infiltrating tumor measures 9 mm.  No lymphvascular or perineural infiltration identified.  Skin of nipple and breast unremarkable.     3. RIGHT SENTINEL LYMPH NODES, EXCISION:               Lymph nodes x7, reactive nodes, no tumor or granulomas         INVASIVE BREAST CANCER STAGING TEMPLATE:  TYPE OF SPECIMEN/PROCEDURE:  Simple mastectomy.  SPECIMEN LATERALITY: Right breast.  TUMOR SITE: 3 o'clock.  TUMOR SIZE: (Size of Largest Invasive Carcinoma): 1.4 x 1.4 x 0.3 cm.  HISTOLOGIC TYPE OF INVASIVE CARCINOMA:  Ductal.  GRADE: Intermediate.  TUBULAR FORMATION:  2.  MITOTIC ACTIVITY:  1.  PLEOMORPHISM:  3.  OVERALL SCORE: 6/9.   DUCTAL CARCINOMA IN SITU (DCIS) EXTENT: Approximately 50%.  TUMOR EXTENSION (Only if structures present and involved):  None.   SKIN: N/A.   NIPPLE: N/A.   SKELETAL MUSCLE: N/A.  SURGICAL MARGINS (Uninvolved/positive): Uninvolved.  INVASIVE CARCINOMA MARGINS (Uninvolved/Positive) Uninvolved.  DISTANCE FROM CLOSEST MARGIN: 0.9 cm.  SPECIFIC CLOSEST MARGIN: Posterior.  DCIS MARGINS (Uninvolved/Positive/No DCIS): Uninvolved.  DISTANCE FROM CLOSEST MARGIN: 0.5 cm.  SPECIFIC CLOSEST MARGIN: Posterior.  LYMPH NODES (required only if lymph nodes are present in the specimen):  submitted  NUMBER OF  LYMPH NODES WITH MACROMETASTASIS: zero  NUMBER OF LYMPH NODES WITH MICROMETASTASIS: zero  NUMBER OF LYMPH NODES WITH ISOLATED TUMOR CELLS: zero  TOTAL NUMBER OF LYMPH NODES EXAMINED (Including sentinel nodes): 7  NUMBER OF SENTINEL NODES EXAMINED: 7  EXTRANODAL EXTENSION OF TUMOR:  absent  VASCULAR/LYMPHATIC INVASION:  Not identified.    ESTROGEN RECEPTOR STATUS BY IHC METHOD: Positive (see case No. [id number])  PROGESTERONE RECEPTOR STATUS BY IHC METHOD: Positive (see case No. [id number])  HER-2/freddy ONCOPROTEIN STATUS BY IHC METHOD:  Negative (see case No. [id number])  HER-2/freddy ONCOGENE STATUS BY IN SITU HYBRIDIZATION ANALYSIS:  Not performed.  TREATMENT EFFECT (BREAST): None.  TREATMENT EFFECT (NODES): None.  ADDITIONAL PATHOLOGIC FINDINGS:  Mild adenosis.  OTHER STUDIES:  None.  AJCC PATHOLOGIC STAGE:  (COMPLETED BY PATHOLOGIST, BASED ONLY ON TISSUE FINDINGS, MORE EXTENSIVE DISEASE MAY NOT BE KNOWN TO THE PATHOLOGIST)  pT=  T1c  pN=  N0  pM=  unknown    DGD/dlb             ASSESSMENT:    1.  Stage I ER IN positive HER-2 negative right breast cancer status post bilateral mastectomies.  I went over in detail the concept of adjuvant therapy.  I think she would be a good candidate for Oncotype testing.  She is premenopausal and sometimes can harbor high risk genetics in the setting.  Even though her tumor does suggest a low risk finding, I still would recommend Oncotype testing on her.  If she does fall into the low risk category then I would place her on tamoxifen for the next 5 years.  If she happens to have a high risk for then I would place her on chemotherapy adjuvantly before considering hormone blockade.  Because of her family history I think she would be a good candidate for genetics counseling.  Even though she may not be a BRCA carrier, there are other possibilities that need to be evaluated.  I went over in detail the plan going forward and answered all the questions that they had for me today.  I will see  them back in my clinic in 3 weeks to go over the test and if she needs anything in the interim she can always give me a call.      I spent a total of 60 minutes in direct patient care, greater than 45 minutes (greater than 50%) were spent in coordination of care, and counseling the patient regarding  Breast cancer . Answered any questions patient had with the plan.      Thank you for allowing me to participate in the care of this patient.    Yours sincerely,    Tia Zelaya MD  [facility]  Hematology and Oncology    Return on: 10/21/19  Return in (Approximately): 3 weeks    Orders Placed This Encounter   Procedures   • Ambulatory Referral to Genetic Counseling ([place])     Referral Priority:   Routine     Referral Type:   Consultation     Referral Reason:   Specialty Services Required     Number of Visits Requested:   1         Please note that portions of this note may have been completed with a voice recognition program. Efforts were made to edit the dictations, but occasionally words are mistranscribed.

## 2019-10-12 LAB
CYTO UR: NORMAL
LAB AP CASE REPORT: NORMAL
LAB AP CLINICAL INFORMATION: NORMAL
LAB AP GENOMIC HEALTH, ADDENDUM: NORMAL
PATH REPORT.FINAL DX SPEC: NORMAL
PATH REPORT.GROSS SPEC: NORMAL

## 2019-10-21 ENCOUNTER — OFFICE VISIT (OUTPATIENT)
Dept: ONCOLOGY | Facility: CLINIC | Age: 44
End: 2019-10-21

## 2019-10-21 ENCOUNTER — CLINICAL SUPPORT (OUTPATIENT)
Dept: GENETICS | Facility: HOSPITAL | Age: 44
End: 2019-10-21

## 2019-10-21 ENCOUNTER — APPOINTMENT (OUTPATIENT)
Dept: LAB | Facility: HOSPITAL | Age: 44
End: 2019-10-21

## 2019-10-21 VITALS
HEART RATE: 70 BPM | HEIGHT: 67 IN | TEMPERATURE: 98.2 F | RESPIRATION RATE: 16 BRPM | OXYGEN SATURATION: 99 % | BODY MASS INDEX: 21.5 KG/M2 | WEIGHT: 137 LBS | DIASTOLIC BLOOD PRESSURE: 73 MMHG | SYSTOLIC BLOOD PRESSURE: 104 MMHG

## 2019-10-21 DIAGNOSIS — C50.911 MALIGNANT NEOPLASM OF RIGHT BREAST IN FEMALE, ESTROGEN RECEPTOR POSITIVE, UNSPECIFIED SITE OF BREAST (HCC): Primary | ICD-10-CM

## 2019-10-21 DIAGNOSIS — Z17.0 MALIGNANT NEOPLASM OF RIGHT BREAST IN FEMALE, ESTROGEN RECEPTOR POSITIVE, UNSPECIFIED SITE OF BREAST (HCC): Primary | ICD-10-CM

## 2019-10-21 DIAGNOSIS — Z80.3 FAMILY HISTORY OF BREAST CANCER: ICD-10-CM

## 2019-10-21 DIAGNOSIS — Z13.79 ENCOUNTER FOR GENETIC SCREENING: ICD-10-CM

## 2019-10-21 DIAGNOSIS — Z80.0 FAMILY HISTORY OF STOMACH CANCER: ICD-10-CM

## 2019-10-21 DIAGNOSIS — C50.411 PRIMARY MALIGNANT NEOPLASM OF UPPER OUTER QUADRANT OF FEMALE BREAST, RIGHT (HCC): Primary | ICD-10-CM

## 2019-10-21 DIAGNOSIS — Z13.79 GENETIC TESTING: Primary | ICD-10-CM

## 2019-10-21 PROCEDURE — 96040: CPT | Performed by: GENETIC COUNSELOR, MS

## 2019-10-21 PROCEDURE — 99214 OFFICE O/P EST MOD 30 MIN: CPT | Performed by: INTERNAL MEDICINE

## 2019-10-21 RX ORDER — DEXAMETHASONE 4 MG/1
20 TABLET ORAL
Qty: 20 TABLET | Refills: 0 | Status: SHIPPED | OUTPATIENT
Start: 2019-10-21 | End: 2021-07-09

## 2019-10-21 RX ORDER — ERGOCALCIFEROL 1.25 MG/1
50000 CAPSULE ORAL WEEKLY
Refills: 1 | COMMUNITY
Start: 2019-10-19 | End: 2021-07-09

## 2019-10-21 RX ORDER — MOMETASONE FUROATE 1 MG/G
CREAM TOPICAL
Refills: 0 | COMMUNITY
Start: 2019-10-19 | End: 2021-08-04

## 2019-10-21 RX ORDER — PREDNISONE 20 MG/1
20 TABLET ORAL 2 TIMES DAILY
Refills: 0 | COMMUNITY
Start: 2019-10-19 | End: 2021-07-09

## 2019-10-21 RX ORDER — TRIAMCINOLONE ACETONIDE 1 MG/G
CREAM TOPICAL
Refills: 0 | COMMUNITY
Start: 2019-10-19 | End: 2021-07-09

## 2019-10-21 RX ORDER — TAMOXIFEN CITRATE 20 MG/1
20 TABLET ORAL DAILY
Qty: 90 TABLET | Refills: 4 | Status: SHIPPED | OUTPATIENT
Start: 2019-10-21 | End: 2021-07-09

## 2019-10-21 NOTE — PROGRESS NOTES
PROBLEM LIST:     Primary malignant neoplasm of upper outer quadrant of female breast, right (CMS/HCC)    8/14/2019 Initial Diagnosis     Primary malignant neoplasm of upper outer quadrant of female breast, right (CMS/HCC)         10/21/2019 Cancer Staged     Cancer Staging  Primary malignant neoplasm of upper outer quadrant of female breast, right (CMS/HCC)  Staging form: Breast, AJCC 8th Edition  - Pathologic stage from 8/14/2019: Stage IA (pT1c, pN0, cM0, G2, ER: Positive, FL: Positive, HER2: Negative) - Signed by Tia Zelaya MD on 9/30/2019    Low risk Oncotype - 7         10/21/2019 -  Hormonal Therapy     Tamoxifen             REASON FOR VISIT: Management of my breast cancer    HISTORY OF PRESENT ILLNESS:   44 y.o.  female presents today for follow-up of her breast cancer.  She is doing reasonably well.  She presents after having her Oncotype performed.  Denies any issues with pain.  No other symptoms ongoing.    Past medical history, social history and family history was reviewed and unchanged from prior visit.    Review of Systems:    Review of Systems - Oncology   A comprehensive 14 point review of systems was performed and was negative except as mentioned.      Medications:        Current Outpatient Medications:   •  cetirizine (zyrTEC) 10 MG tablet, Take 10 mg by mouth As Needed for Allergies., Disp: , Rfl:   •  Cholecalciferol (VITAMIN D PO), Take  by mouth., Disp: , Rfl:   •  mometasone (ELOCON) 0.1 % cream, APPLY TO HANDS 2 TIMES A DAY FOR 3 TO 4 WEEKS, THEN TAKE A BREAK ...  (REFER TO PRESCRIPTION NOTES)., Disp: , Rfl: 0  •  Multiple Vitamins-Minerals (MULTIVITAMIN ADULT PO), Take 1 tablet by mouth Daily., Disp: , Rfl:   •  predniSONE (DELTASONE) 20 MG tablet, Take 20 mg by mouth 2 (Two) Times a Day. for 7 days, Disp: , Rfl: 0  •  triamcinolone (KENALOG) 0.1 % cream, APPLY TO THE AFFECTED AREAS 2 TIMES A DAY FOR 2 TO 3 WEEKS, THEN ...  (REFER TO PRESCRIPTION NOTES)., Disp: , Rfl: 0  •   "vitamin D (ERGOCALCIFEROL) 39250 units capsule capsule, Take 50,000 Units by mouth 1 (One) Time Per Week., Disp: , Rfl: 1  •  dexamethasone (DECADRON) 4 MG tablet, Take 5 tablets by mouth Daily With Breakfast., Disp: 20 tablet, Rfl: 0  •  tamoxifen (NOLVADEX) 20 MG chemo tablet, Take 1 tablet by mouth Daily., Disp: 90 tablet, Rfl: 4      ALLERGIES:  No Known Allergies      Physical Exam    VITAL SIGNS:  /73 Comment: LUE  Pulse 70   Temp 98.2 °F (36.8 °C) (Temporal)   Resp 16   Ht 168.9 cm (66.5\")   Wt 62.1 kg (137 lb)   SpO2 99% Comment: RA  BMI 21.78 kg/m²     Wt Readings from Last 3 Encounters:   10/21/19 62.1 kg (137 lb)   09/30/19 60.3 kg (133 lb)   08/13/19 63.5 kg (140 lb)       Body mass index is 21.78 kg/m². Body surface area is 1.71 meters squared.         Performance Status: 0    General: well appearing, in no acute distress  HEENT: sclera anicteric, oropharynx clear, neck is supple  Lymphatics: no cervical, supraclavicular, or axillary adenopathy  Cardiovascular: regular rate and rhythm, no murmurs, rubs or gallops  Lungs: clear to auscultation bilaterally  Abdomen: soft, nontender, nondistended.  No palpable organomegaly  Extremities: no lower extremity edema  Skin: no rashes, lesions, bruising, or petechiae  Msk:  Shows no weakness of the large muscle groups  Psych: Mood is stable        RECENT LABS:    Lab Results   Component Value Date    HGB 12.8 08/14/2019    HCT 39.6 08/14/2019    MCV 87.4 08/14/2019     08/14/2019    WBC 5.11 08/14/2019       No results found for: GLUCOSE, BUN, CREATININE, NA, K, CL, CO2, CALCIUM, PROTEINTOT, ALBUMIN, BILITOT, ALKPHOS, AST, ALT    Mammo Post Clip Placement Right    Addendum Date: 7/30/2019    PATHOLOGY: Invasive ductal carcinoma, intermediate grade. Associated ductal carcinoma in situ, intermediate grade, cribriform pattern.  The pathology results are felt to be concordant with the imaging findings.  RECOMMENDATION: Recommend surgical " consultation as well as preoperative breast MRI.  The patient will be notified of the results/recommendations by our breast imaging nurse.  This report was finalized on 7/30/2019 9:00 AM by Dr. Tonya Denson MD.      Us Guided Cyst Aspiration Breast    Addendum Date: 7/30/2019    CYTOLOGY: [facility].  The cytology results are felt to be concordant with the imaging findings.  RECOMMENDATION:  Recommend excisional biopsy.  The patient was notified of the results/recommendations by her breast imaging staff.  This report was finalized on 7/30/2019 9:00 AM by Dr. Tonya Denson MD.      Result Date: 7/30/2019  Fine-needle aspiration biopsy of a 0.4 cm probable complicated cyst 3:00 right breast. Cytology results are pending.  This report was finalized on 7/11/2019 8:55 AM by Dr. Tonya Denson MD.      Nm [place] Node Injection Only    Result Date: 8/15/2019  Pharmaceutical for injection into the right breast for sentinel mapping and lymphoscintigraphy for right breast carcinoma.  D:  08/15/2019 E:  08/15/2019     This report was finalized on 8/15/2019 2:10 PM by Dr. Radha Briones MD.      Mammo Diagnostic Digital Tomosynthesis Right With Cad    Result Date: 7/11/2019  There is a persistent irregular isodense mass in the posterior right 3:00 position. Sonographically this correlates to an irregular appearing isointense mass as described above measuring 0.7 cm in size. Mammographically there is an adjacent smaller mass. This is seen sonographically as well and measures 0.4 cm on this modality. This smaller mass may reflect a small complicated cyst. The larger mass is concerning for small invasive carcinoma. Further evaluation with ultrasound-guided biopsy is recommended and will be performed today.  ACR BI-RADS CATEGORY:  5, HIGHLY SUGGESTIVE OF MALIGNANCY  RECOMMENDATION:  Recommend ultrasound-guided core biopsy of a 0.7 cm mass located in the 3 o'clock position of the right breast as described above as well as  ultrasound guided aspiration of a possible 0.4 cm cyst also located at 3 o'clock as described above.  CAD was utilized.  The standard false-negative rate of mammography is between 10% and 25%. Complex patterns or increased breast density will markedly elevate the false-negative rate of mammography.    A letter, in lay terminology, with the results of this exam was given to the patient at the time of the visit.  __________________________________________________________ Physician Order  Ultrasound Guided Breast Biopsy  Diagnosis: Abnormal Mammogram  This report was finalized on 7/11/2019 9:02 AM by Dr. Tonya Denson MD.      Us Breast Right Limited    Result Date: 7/11/2019  There is a persistent irregular isodense mass in the posterior right 3:00 position. Sonographically this correlates to an irregular appearing isointense mass as described above measuring 0.7 cm in size. Mammographically there is an adjacent smaller mass. This is seen sonographically as well and measures 0.4 cm on this modality. This smaller mass may reflect a small complicated cyst. The larger mass is concerning for small invasive carcinoma. Further evaluation with ultrasound-guided biopsy is recommended and will be performed today.  ACR BI-RADS CATEGORY:  5, HIGHLY SUGGESTIVE OF MALIGNANCY  RECOMMENDATION:  Recommend ultrasound-guided core biopsy of a 0.7 cm mass located in the 3 o'clock position of the right breast as described above as well as ultrasound guided aspiration of a possible 0.4 cm cyst also located at 3 o'clock as described above.  CAD was utilized.  The standard false-negative rate of mammography is between 10% and 25%. Complex patterns or increased breast density will markedly elevate the false-negative rate of mammography.    A letter, in lay terminology, with the results of this exam was given to the patient at the time of the visit.  __________________________________________________________ Physician Order  Ultrasound Guided  Breast Biopsy  Diagnosis: Abnormal Mammogram  This report was finalized on 7/11/2019 9:02 AM by Dr. Tonya Denson MD.      Mammo Screening Digital Tomosynthesis Bilateral With Cad    Result Date: 7/3/2019  Findings right breast for which additional views are recommended.  ACR BI-RADS CATEGORY:  0, INCOMPLETE:   NEED ADDITIONAL IMAGING EVALUATION  RECOMMENDATION: Recommend right CC and MLO focal compression views as well as a right ML view. Also, the patient is a candidate for annual high-risk screening breast MRI based on the Cindi risk assessment model performed at time of screening.  CAD was utilized.  The standard false-negative rate of mammography is between 10% and 25%. Complex patterns or increased breast density will markedly elevate the false-negative rate of mammography.    A letter, in lay terminology, with the results of this exam will be mailed to the patient.   The patient will be contacted by our office to schedule for the additional imaging evaluation.  Please accept this as sufficient order for the additional imaging evaluation.  ________________________________________________________________________ _______ Physicians Order  Diagnostic Mammogram with Breast Ultrasound if needed  Diagnosis: Abnormal Mammogram  This report was finalized on 7/3/2019 8:34 AM by Dr. Tonya Denson MD.      Us Guided Breast Biopsy With & Without Device Initial    Addendum Date: 7/30/2019    PATHOLOGY: Invasive ductal carcinoma, intermediate grade. Associated ductal carcinoma in situ, intermediate grade, cribriform pattern.  The pathology results are felt to be concordant with the imaging findings.  RECOMMENDATION: Recommend surgical consultation as well as preoperative breast MRI.  The patient will be notified of the results/recommendations by our breast imaging nurse.  This report was finalized on 7/30/2019 9:00 AM by Dr. Tonya Denson MD.            Assessment/Plan    1.  Stage I ER OR positive HER-2 negative breast  cancer in a premenopausal woman.  Her Oncotype score reveals that she falls into the low risk category with a total score of 7.  She will not benefit from adjuvant chemotherapy.  I have recommended 5 years of hormone blockade.  We discussed the implications of the test.  She is still premenopausal so tamoxifen is 1 of her choices.  The other option is considering Lupron along with with aromatase inhibitor.  However I think this is probably be a better choice for her.  We will see how she does in 3 months.  I usually check to see if they tolerating it well.      I spent a total of 25 minutes in direct patient care, greater than 20 minutes (greater than 50%) were spent in coordination of care, and counseling the patient regarding  Breast Cancer . Answered any questions patient had with medication and plan.      Tia Zelaya MD  [facility] Hematology and Oncology    Return on: 10/19/20  Return in (Approximately): 3 months    No orders of the defined types were placed in this encounter.      10/21/2019         Please note that portions of this note may have been completed with a voice recognition program. Efforts were made to edit the dictations, but occasionally words are mistranscribed.

## 2019-10-24 NOTE — PROGRESS NOTES
Belinda Smallwood, a 44-year-old female, was referred for genetic counseling due to a personal history of breast cancer. Ms. Smallwood was recently diagnosed with an ER/LA positive breast cancer of the right breast at age 44. She recently had a bilateral mastectomy. She retains her uterus and ovaries.  She was interested in discussing her risk for a hereditary cancer syndrome.  Ms. Smallwood was interested in pursuing a multi-gene panel to evaluate her risk of cancer, therefore the CancerNext panel was ordered through "[employer] which analyzes BRCA1/2 and 32 additional genes associated with an increased cancer risk. The genes on this panel include APC, FAN, BARD1, BMPR1A, BRCA1, BRCA2, BRIP1, CDH1, CDK4, CDKN2A, CHEK2, DICER1, EPCAM, GREM1, HOXB13, MLH1, MRE11A, MSH2, MSH6, MUTYH, NBN, NF1, PALB2, PMS2, POLD1, POLE, PTEN, RAD50, RAD51C, RAD51D, SMAD4, SMARCA4, STK11, and TP53. Results are expected within 2-3 weeks.     PERTINENT FAMILY HISTORY: (See attached pedigree)   Mother:  Left breast cancer, 65; Right breast cancer, 70  Mat. Grandmother: Breast cancer, 58; Stomach cancer, 60s    We do not have medical records regarding any of these diagnoses.      RISK ASSESSMENT:  Ms. Smallwood’s personal and family history of cancer led to concern for a hereditary cancer syndrome. We discussed BRCA1/2 testing as well as the option of pursuing a panel that would test for other genes known to impact cancer risk in addition to BRCA1/2.   Ms. Smallwood clearly meets NCCN guidelines criteria for BRCA1/2 testing based on her personal and family history of breast cancer. These risk assessments are based on the family history information provided at the time of the appointment, and could change in the future should new information be obtained.    GENETIC COUNSELING (30 minutes):  We reviewed the family history information in detail. Cases of cancer follow three general patterns: sporadic, familial, and hereditary.  While most cancer is sporadic, some  cases appear to occur in family clusters.  These cases are said to be familial and account for 10-20% of cancer cases.  Familial cases may be due to a combination of shared genes and environmental factors among family members.  In even fewer families, the cancer is said to be inherited, and the genes responsible for the cancer are known.      Family histories typical of hereditary cancer syndromes usually include multiple first- and second-degree relatives diagnosed with cancer types that define a syndrome.  These cases tend to be diagnosed at younger-than-expected ages and can be bilateral or multifocal.  The cancer in these families follows an autosomal dominant inheritance pattern, which indicates the likely presence of a mutation in a cancer susceptibility gene.  Children and siblings of an individual believed to carry this mutation have a 50% chance of inheriting that mutation, thereby inheriting the increased risk to develop cancer.  These mutations can be passed down from the maternal or the paternal lineage.    Hereditary breast cancer accounts for 5-10% of all cases of breast cancer.  A significant proportion of hereditary breast and ovarian cancer can be attributed to mutations in the BRCA1 and BRCA2 genes.  Mutations in these genes confer an increased risk for breast cancer, ovarian cancer, male breast cancer, prostate cancer, and pancreatic cancer. Women with a BRCA1 or BRCA2 mutation who have already been diagnosed with breast cancer have a 40-60% lifetime risk of a second breast cancer. Women with a BRCA1 or BRCA2 mutation have up to a 44% risk of ovarian cancer.      There are other genes that are known to be associated with an increased risk for cancer.  Some of these genes have well defined cancer risks and established management guidelines.  Other genes that can be tested for have been more recently described, and there may be less data regarding the risks and therefore may not have established  management guidelines. We discussed these limitations at length.  Based on Ms. Smallwood’s desire to get as much information as possible regarding her personal risks and potential risks for her family, she opted to pursue testing through a panel that would look at several other genes known to increase the risk for cancer.    GENETIC TESTING:  The risks, benefits and limitations of genetic testing and implications for clinical management following testing were reviewed.  DNA test results can influence decisions regarding screening, prevention and surgical management.  Genetic testing can have significant psychological implications for both individuals and families.  Also discussed was the possibility of employment and insurance discrimination based on genetic test results and the laws in place to prevent this (MOLLY).    We discussed panel testing, which would involve testing for BRCA1/2 as well as 32 additional genes that are associated with increased cancer risk. The benefits and limitations of genetic testing were discussed and Ms. Smallwood decided to pursue testing via the panel. The implications of a positive or negative test result were discussed. We discussed the possibility that, in some cases, genetic test results may be informative or may be ambiguous due to the identification of a genetic variant. These variants may or may not be associated with an increased cancer risk.  With multigene panel testing, it is not uncommon for a variant of uncertain significance (VUS) to be identified.  If a VUS is identified, testing family members is typically not recommended and screening recommendations are made based on the family history.  The laboratories that perform genetic testing work to reclassify the VUS and send out an amended report if and when a VUS is reclassified.  The majority of variant findings are ultimately reclassified to a negative result.  Given her personal and family history, a negative test result would not  eliminate all cancer risk to her relatives, although the risk would not be as high as it would with positive genetic testing.      PLAN: Genetic testing via the CancerNext panel through [facility] was ordered and results are expected within 2-3 weeks. Ms. Smallwood is welcome to contact us in the meantime with any questions she may have.      Carmela Cruz MS, [facility] [place], [facility]  Licensed Certified Genetic Counselor

## 2019-11-26 ENCOUNTER — DOCUMENTATION (OUTPATIENT)
Dept: GENETICS | Facility: HOSPITAL | Age: 44
End: 2019-11-26

## 2019-11-26 NOTE — PROGRESS NOTES
Belinda Smallwood, a 44-year-old female, was referred for genetic counseling due to a personal history of breast cancer. Ms. Smallwood was recently diagnosed with an ER/MO positive breast cancer of the right breast at age 44. She recently had a bilateral mastectomy. She retains her uterus and ovaries.  She was interested in discussing her risk for a hereditary cancer syndrome.  Ms. Smallwood was interested in pursuing a multi-gene panel to evaluate her risk of cancer, therefore the CancerNext panel was ordered through [employer] which analyzes BRCA1/2 and 32 additional genes associated with an increased cancer risk. The genes on this panel include APC, FAN, BARD1, BMPR1A, BRCA1, BRCA2, BRIP1, CDH1, CDK4, CDKN2A, CHEK2, DICER1, EPCAM, GREM1, HOXB13, MLH1, MRE11A, MSH2, MSH6, MUTYH, NBN, NF1, PALB2, PMS2, POLD1, POLE, PTEN, RAD50, RAD51C, RAD51D, SMAD4, SMARCA4, STK11, and TP53. Genetic testing was negative for pathogenic mutations in BRCA1/2 and 32 additional genes on the CancerNext panel.  These normal results were discussed with Ms. Smallwood by telephone on 11/26/19.      PERTINENT FAMILY HISTORY: (See attached pedigree)   Mother:  Left breast cancer, 65; Right breast cancer, 70  Mat. Grandmother: Breast cancer, 58; Stomach cancer, 60s  Mat. Grandfather: Liver cancer, 63    We do not have medical records regarding any of these diagnoses.      RISK ASSESSMENT:  Ms. Smallwood’s personal and family history of cancer led to concern for a hereditary cancer syndrome. We discussed BRCA1/2 testing as well as the option of pursuing a panel that would test for other genes known to impact cancer risk in addition to BRCA1/2.   Ms. Smallwood clearly meets NCCN guidelines criteria for BRCA1/2 testing based on her personal and family history of breast cancer. These risk assessments are based on the family history information provided at the time of the appointment, and could change in the future should new information be obtained.    GENETIC COUNSELING:   We reviewed the family history information in detail. Cases of cancer follow three general patterns: sporadic, familial, and hereditary.  While most cancer is sporadic, some cases appear to occur in family clusters.  These cases are said to be familial and account for 10-20% of cancer cases.  Familial cases may be due to a combination of shared genes and environmental factors among family members.  In even fewer families, the cancer is said to be inherited, and the genes responsible for the cancer are known.      Family histories typical of hereditary cancer syndromes usually include multiple first- and second-degree relatives diagnosed with cancer types that define a syndrome.  These cases tend to be diagnosed at younger-than-expected ages and can be bilateral or multifocal.  The cancer in these families follows an autosomal dominant inheritance pattern, which indicates the likely presence of a mutation in a cancer susceptibility gene.  Children and siblings of an individual believed to carry this mutation have a 50% chance of inheriting that mutation, thereby inheriting the increased risk to develop cancer.  These mutations can be passed down from the maternal or the paternal lineage.    Hereditary breast cancer accounts for 5-10% of all cases of breast cancer.  A significant proportion of hereditary breast and ovarian cancer can be attributed to mutations in the BRCA1 and BRCA2 genes.  Mutations in these genes confer an increased risk for breast cancer, ovarian cancer, male breast cancer, prostate cancer, and pancreatic cancer. Women with a BRCA1 or BRCA2 mutation who have already been diagnosed with breast cancer have a 40-60% lifetime risk of a second breast cancer. Women with a BRCA1 or BRCA2 mutation have up to a 44% risk of ovarian cancer.      There are other genes that are known to be associated with an increased risk for cancer.  Some of these genes have well defined cancer risks and established  management guidelines.  Other genes that can be tested for have been more recently described, and there may be less data regarding the risks and therefore may not have established management guidelines. We discussed these limitations at length.  Based on Ms. Smallwood’s desire to get as much information as possible regarding her personal risks and potential risks for her family, she opted to pursue testing through a panel that would look at several other genes known to increase the risk for cancer.    GENETIC TESTING:  The risks, benefits and limitations of genetic testing and implications for clinical management following testing were reviewed.  DNA test results can influence decisions regarding screening, prevention and surgical management.  Genetic testing can have significant psychological implications for both individuals and families.  Also discussed was the possibility of employment and insurance discrimination based on genetic test results and the laws in place to prevent this (MOLLY).    We discussed panel testing, which would involve testing for BRCA1/2 as well as 32 additional genes that are associated with increased cancer risk. The benefits and limitations of genetic testing were discussed and Ms. Smallwood decided to pursue testing via the panel. The implications of a positive or negative test result were discussed. We discussed the possibility that, in some cases, genetic test results may be informative or may be ambiguous due to the identification of a genetic variant. These variants may or may not be associated with an increased cancer risk.  With multigene panel testing, it is not uncommon for a variant of uncertain significance (VUS) to be identified.  If a VUS is identified, testing family members is typically not recommended and screening recommendations are made based on the family history.  The laboratories that perform genetic testing work to reclassify the VUS and send out an amended report if and when  a VUS is reclassified.  The majority of variant findings are ultimately reclassified to a negative result.  Given her personal and family history, a negative test result would not eliminate all cancer risk to her relatives, although the risk would not be as high as it would with positive genetic testing.      TEST RESULTS:  Genetic testing was negative for known pathogenic mutations by sequencing and rearrangement testing for the genes on the CancerNext panel (see attached results).    This negative result greatly lowers but does not eliminate the risk of a hereditary cancer syndrome for Ms. Smallwood.  This assessment is based on the information provided at time of consultation.    CANCER SCREENING: Ms. Smallwood’s surveillance and management should be determined by her oncology team. Despite the negative genetic test results, Ms. Smallwood’s female relatives may have a somewhat increased lifetime risk for breast cancer based on family history.  Given the increased risk, options available to individuals with a high lifetime risk for breast cancer were briefly discussed.  This includes increased surveillance and chemoprevention.     Surveillance for individuals with a high lifetime risk of breast cancer (>20%, versus the average risk of 12%), based on NCCN guidelines, would consist of semi-annual clinical breast exams and monthly self-breast exams starting by age 18 and annual mammography starting 10 years younger than the earliest diagnosis in a close relative, or starting by age 40, whichever is earliest.  According to an American [employer] Society expert panel, annual breast MRI should be offered to women whose lifetime risk of breast cancer is 20-25 percent or more, also starting by age 40 or earlier if indicated by family history.  Female relatives could have a risk assessment performed using a family history-based model, such as the Tyrer-Cuzick model, to determine their individual risks.      PLAN: Genetic counseling remains  available to Ms. Smallwood and her family. She is welcome to contact our office with any questions or concerns at [phone].      Carmela Cruz MS, [facility] [place], [facility]  Licensed Certified Genetic Counselor         Cc: MD Tia Gore MD

## 2020-01-22 ENCOUNTER — OFFICE VISIT (OUTPATIENT)
Dept: ONCOLOGY | Facility: CLINIC | Age: 45
End: 2020-01-22

## 2020-01-22 VITALS
BODY MASS INDEX: 21.35 KG/M2 | RESPIRATION RATE: 16 BRPM | WEIGHT: 136 LBS | HEIGHT: 67 IN | TEMPERATURE: 97.7 F | DIASTOLIC BLOOD PRESSURE: 72 MMHG | OXYGEN SATURATION: 98 % | HEART RATE: 65 BPM | SYSTOLIC BLOOD PRESSURE: 106 MMHG

## 2020-01-22 DIAGNOSIS — C50.411 PRIMARY MALIGNANT NEOPLASM OF UPPER OUTER QUADRANT OF FEMALE BREAST, RIGHT (HCC): Primary | ICD-10-CM

## 2020-01-22 PROCEDURE — 99213 OFFICE O/P EST LOW 20 MIN: CPT | Performed by: INTERNAL MEDICINE

## 2020-01-22 NOTE — PROGRESS NOTES
PROBLEM LIST:     Primary malignant neoplasm of upper outer quadrant of female breast, right (CMS/HCC)    7/10/2019 Biopsy     Right breast      8/14/2019 Initial Diagnosis     Primary malignant neoplasm of upper outer quadrant of female breast, right (CMS/HCC)      8/14/2019 Surgery     Surgery       Procedure:  Bilateral mastectomy      Location:  Both breasts      Completeness of resection:  No evidence of residual tumor        10/21/2019 Cancer Staged     Cancer Staging  Primary malignant neoplasm of upper outer quadrant of female breast, right (CMS/HCC)  Staging form: Breast, AJCC 8th Edition  - Pathologic stage from 8/14/2019: Stage IA (pT1c, pN0, cM0, G2, ER: Positive, NV: Positive, HER2: Negative) - Signed by Tia Zelaya MD on 9/30/2019    Low risk Oncotype - 7      10/21/2019 -  Hormonal Therapy     Tamoxifen          REASON FOR VISIT: Management of my breast cancer    HISTORY OF PRESENT ILLNESS:   44 y.o.  female presents today for follow-up of her breast cancer.  She has been on tamoxifen for 3 months now.  She was having headaches with the medicine for the first month or so.  That has since subsided.  She has the symptoms of menopause with some hot flashes.  But otherwise doing well.    Past medical history, social history and family history was reviewed and unchanged from prior visit.    Review of Systems:    Review of Systems   Constitutional: Negative.    HENT:  Negative.    Eyes: Negative.    Respiratory: Negative.    Cardiovascular: Negative.    Gastrointestinal: Negative.    Endocrine: Positive for hot flashes.   Genitourinary: Negative.     Musculoskeletal: Negative.    Skin: Negative.    Neurological: Negative.    Hematological: Negative.    Psychiatric/Behavioral: Negative.       A comprehensive 14 point review of systems was performed and was negative except as mentioned.      Medications:        Current Outpatient Medications:   •  cetirizine (zyrTEC) 10 MG tablet, Take 10 mg by  "mouth As Needed for Allergies., Disp: , Rfl:   •  Cholecalciferol (VITAMIN D PO), Take  by mouth., Disp: , Rfl:   •  mometasone (ELOCON) 0.1 % cream, APPLY TO HANDS 2 TIMES A DAY FOR 3 TO 4 WEEKS, THEN TAKE A BREAK ...  (REFER TO PRESCRIPTION NOTES)., Disp: , Rfl: 0  •  Multiple Vitamins-Minerals (MULTIVITAMIN ADULT PO), Take 1 tablet by mouth Daily., Disp: , Rfl:   •  predniSONE (DELTASONE) 20 MG tablet, Take 20 mg by mouth 2 (Two) Times a Day. for 7 days, Disp: , Rfl: 0  •  tamoxifen (NOLVADEX) 20 MG chemo tablet, Take 1 tablet by mouth Daily., Disp: 90 tablet, Rfl: 4  •  triamcinolone (KENALOG) 0.1 % cream, APPLY TO THE AFFECTED AREAS 2 TIMES A DAY FOR 2 TO 3 WEEKS, THEN ...  (REFER TO PRESCRIPTION NOTES)., Disp: , Rfl: 0  •  vitamin D (ERGOCALCIFEROL) 93809 units capsule capsule, Take 50,000 Units by mouth 1 (One) Time Per Week., Disp: , Rfl: 1  •  dexamethasone (DECADRON) 4 MG tablet, Take 5 tablets by mouth Daily With Breakfast., Disp: 20 tablet, Rfl: 0  •  dexamethasone (DECADRON) 4 MG tablet, Take 5 tablets by mouth once if the hands crack, Disp: 50 tablet, Rfl: 5      ALLERGIES:  No Known Allergies      Physical Exam    VITAL SIGNS:  /72 Comment: LUE  Pulse 65   Temp 97.7 °F (36.5 °C) (Temporal)   Resp 16   Ht 168.9 cm (66.5\")   Wt 61.7 kg (136 lb)   SpO2 98% Comment: RA  BMI 21.62 kg/m²     Wt Readings from Last 3 Encounters:   01/22/20 61.7 kg (136 lb)   10/21/19 62.1 kg (137 lb)   09/30/19 60.3 kg (133 lb)       Body mass index is 21.62 kg/m². Body surface area is 1.71 meters squared.         Performance Status: 0    General: well appearing, in no acute distress  HEENT: sclera anicteric, oropharynx clear, neck is supple  Lymphatics: no cervical, supraclavicular, or axillary adenopathy  Cardiovascular: regular rate and rhythm, no murmurs, rubs or gallops  Lungs: clear to auscultation bilaterally  Abdomen: soft, nontender, nondistended.  No palpable organomegaly  Extremities: no lower " extremity edema  Skin: no rashes, lesions, bruising, or petechiae  Msk:  Shows no weakness of the large muscle groups  Psych: Mood is stable        RECENT LABS:    Lab Results   Component Value Date    HGB 12.8 08/14/2019    HCT 39.6 08/14/2019    MCV 87.4 08/14/2019     08/14/2019    WBC 5.11 08/14/2019       Mammo Post Clip Placement Right    Addendum Date: 7/30/2019    PATHOLOGY: Invasive ductal carcinoma, intermediate grade. Associated ductal carcinoma in situ, intermediate grade, cribriform pattern.  The pathology results are felt to be concordant with the imaging findings.  RECOMMENDATION: Recommend surgical consultation as well as preoperative breast MRI.  The patient will be notified of the results/recommendations by our breast imaging nurse.  This report was finalized on 7/30/2019 9:00 AM by Dr. Tonya Denson MD.      Us Guided Cyst Aspiration Breast    Addendum Date: 7/30/2019    CYTOLOGY: [facility].  The cytology results are felt to be concordant with the imaging findings.  RECOMMENDATION:  Recommend excisional biopsy.  The patient was notified of the results/recommendations by her breast imaging staff.  This report was finalized on 7/30/2019 9:00 AM by Dr. Tonya Denson MD.      Result Date: 7/30/2019  Fine-needle aspiration biopsy of a 0.4 cm probable complicated cyst 3:00 right breast. Cytology results are pending.  This report was finalized on 7/11/2019 8:55 AM by Dr. Tonya Denson MD.      Nm [place] Node Injection Only    Result Date: 8/15/2019  Pharmaceutical for injection into the right breast for sentinel mapping and lymphoscintigraphy for right breast carcinoma.  D:  08/15/2019 E:  08/15/2019     This report was finalized on 8/15/2019 2:10 PM by Dr. Radha Briones MD.      Mammo Diagnostic Digital Tomosynthesis Right With Cad    Result Date: 7/11/2019  There is a persistent irregular isodense mass in the posterior right 3:00 position. Sonographically this correlates to an irregular  appearing isointense mass as described above measuring 0.7 cm in size. Mammographically there is an adjacent smaller mass. This is seen sonographically as well and measures 0.4 cm on this modality. This smaller mass may reflect a small complicated cyst. The larger mass is concerning for small invasive carcinoma. Further evaluation with ultrasound-guided biopsy is recommended and will be performed today.  ACR BI-RADS CATEGORY:  5, HIGHLY SUGGESTIVE OF MALIGNANCY  RECOMMENDATION:  Recommend ultrasound-guided core biopsy of a 0.7 cm mass located in the 3 o'clock position of the right breast as described above as well as ultrasound guided aspiration of a possible 0.4 cm cyst also located at 3 o'clock as described above.  CAD was utilized.  The standard false-negative rate of mammography is between 10% and 25%. Complex patterns or increased breast density will markedly elevate the false-negative rate of mammography.    A letter, in lay terminology, with the results of this exam was given to the patient at the time of the visit.  __________________________________________________________ Physician Order  Ultrasound Guided Breast Biopsy  Diagnosis: Abnormal Mammogram  This report was finalized on 7/11/2019 9:02 AM by Dr. Tonya Denson MD.      Us Breast Right Limited    Result Date: 7/11/2019  There is a persistent irregular isodense mass in the posterior right 3:00 position. Sonographically this correlates to an irregular appearing isointense mass as described above measuring 0.7 cm in size. Mammographically there is an adjacent smaller mass. This is seen sonographically as well and measures 0.4 cm on this modality. This smaller mass may reflect a small complicated cyst. The larger mass is concerning for small invasive carcinoma. Further evaluation with ultrasound-guided biopsy is recommended and will be performed today.  ACR BI-RADS CATEGORY:  5, HIGHLY SUGGESTIVE OF MALIGNANCY  RECOMMENDATION:  Recommend  ultrasound-guided core biopsy of a 0.7 cm mass located in the 3 o'clock position of the right breast as described above as well as ultrasound guided aspiration of a possible 0.4 cm cyst also located at 3 o'clock as described above.  CAD was utilized.  The standard false-negative rate of mammography is between 10% and 25%. Complex patterns or increased breast density will markedly elevate the false-negative rate of mammography.    A letter, in lay terminology, with the results of this exam was given to the patient at the time of the visit.  __________________________________________________________ Physician Order  Ultrasound Guided Breast Biopsy  Diagnosis: Abnormal Mammogram  This report was finalized on 7/11/2019 9:02 AM by Dr. Tonya Denson MD.      Mammo Screening Digital Tomosynthesis Bilateral With Cad    Result Date: 7/3/2019  Findings right breast for which additional views are recommended.  ACR BI-RADS CATEGORY:  0, INCOMPLETE:   NEED ADDITIONAL IMAGING EVALUATION  RECOMMENDATION: Recommend right CC and MLO focal compression views as well as a right ML view. Also, the patient is a candidate for annual high-risk screening breast MRI based on the Cindi risk assessment model performed at time of screening.  CAD was utilized.  The standard false-negative rate of mammography is between 10% and 25%. Complex patterns or increased breast density will markedly elevate the false-negative rate of mammography.    A letter, in lay terminology, with the results of this exam will be mailed to the patient.   The patient will be contacted by our office to schedule for the additional imaging evaluation.  Please accept this as sufficient order for the additional imaging evaluation.  ________________________________________________________________________ _______ Physicians Order  Diagnostic Mammogram with Breast Ultrasound if needed  Diagnosis: Abnormal Mammogram  This report was finalized on 7/3/2019 8:34 AM by Dr. Castaneda  MD Jarett.      Us Guided Breast Biopsy With & Without Device Initial    Addendum Date: 7/30/2019    PATHOLOGY: Invasive ductal carcinoma, intermediate grade. Associated ductal carcinoma in situ, intermediate grade, cribriform pattern.  The pathology results are felt to be concordant with the imaging findings.  RECOMMENDATION: Recommend surgical consultation as well as preoperative breast MRI.  The patient will be notified of the results/recommendations by our breast imaging nurse.  This report was finalized on 7/30/2019 9:00 AM by Dr. Tonya Denson MD.            Assessment/Plan    1.  Stage I ER [place] positive HER-2 negative breast cancer in a premenopausal woman with low risk oncotype.  She has undergone bilateral mastectomies.  She does not need any imaging.  She is tolerating tamoxifen reasonably well.  We had a discussion today about making sure her risk of blood clots is lessened by being active.  She is a non-smoker so that should help.  She is also not obese.  All of these places her at decrease risk.  I will see her back in my clinic in 6 months to see how she is doing.  There is any symptoms that arise in the interim she can always give me a call.    I spent a total of 15 minutes in direct patient care, greater than 10 minutes (greater than 50%) were spent in coordination of care, and counseling the patient regarding  Breast Cancer . Answered any questions patient had with medication and plan.      Tia Zelaya MD  [facility]    Return on: 07/22/20  Return in (Approximately): 6 months    No orders of the defined types were placed in this encounter.      1/22/2020         Please note that portions of this note may have been completed with a voice recognition program. Efforts were made to edit the dictations, but occasionally words are mistranscribed.

## 2020-07-22 ENCOUNTER — OFFICE VISIT (OUTPATIENT)
Dept: ONCOLOGY | Facility: CLINIC | Age: 45
End: 2020-07-22

## 2020-07-22 ENCOUNTER — HOSPITAL ENCOUNTER (OUTPATIENT)
Dept: CT IMAGING | Facility: HOSPITAL | Age: 45
Discharge: HOME | End: 2020-07-22
Admitting: INTERNAL MEDICINE

## 2020-07-22 VITALS
HEART RATE: 70 BPM | OXYGEN SATURATION: 98 % | BODY MASS INDEX: 21.5 KG/M2 | SYSTOLIC BLOOD PRESSURE: 104 MMHG | TEMPERATURE: 97.9 F | HEIGHT: 67 IN | DIASTOLIC BLOOD PRESSURE: 67 MMHG | RESPIRATION RATE: 16 BRPM | WEIGHT: 137 LBS

## 2020-07-22 DIAGNOSIS — C50.411 PRIMARY MALIGNANT NEOPLASM OF UPPER OUTER QUADRANT OF FEMALE BREAST, RIGHT (HCC): ICD-10-CM

## 2020-07-22 DIAGNOSIS — C50.411 PRIMARY MALIGNANT NEOPLASM OF UPPER OUTER QUADRANT OF FEMALE BREAST, RIGHT (HCC): Primary | ICD-10-CM

## 2020-07-22 PROCEDURE — 99213 OFFICE O/P EST LOW 20 MIN: CPT | Performed by: INTERNAL MEDICINE

## 2020-07-22 PROCEDURE — 71260 CT THORAX DX C+: CPT

## 2020-07-22 PROCEDURE — 25010000002 IOPAMIDOL 61 % SOLUTION: Performed by: INTERNAL MEDICINE

## 2020-07-22 RX ADMIN — IOPAMIDOL 75 ML: 612 INJECTION, SOLUTION INTRAVENOUS at 14:40

## 2020-07-22 NOTE — PROGRESS NOTES
PROBLEM LIST:     Primary malignant neoplasm of upper outer quadrant of female breast, right (CMS/HCC)    7/10/2019 Biopsy     Right breast      8/14/2019 Initial Diagnosis     Primary malignant neoplasm of upper outer quadrant of female breast, right (CMS/HCC)      8/14/2019 Surgery     Surgery       Procedure:  Bilateral mastectomy      Location:  Both breasts      Completeness of resection:  No evidence of residual tumor        10/21/2019 Cancer Staged     Cancer Staging  Primary malignant neoplasm of upper outer quadrant of female breast, right (CMS/HCC)  Staging form: Breast, AJCC 8th Edition  - Pathologic stage from 8/14/2019: Stage IA (pT1c, pN0, cM0, G2, ER: Positive, FL: Positive, HER2: Negative) - Signed by Tia Zelaya MD on 9/30/2019    Low risk Oncotype - 7      10/21/2019 -  Hormonal Therapy     Tamoxifen          REASON FOR VISIT: Management of my breast cancer    HISTORY OF PRESENT ILLNESS:   45 y.o.  female presents today for follow-up of her breast cancer.  She could not tolerate tamoxifen.  It was causing a fair bit of mood changes and headaches.  She stopped it in May.  She still has spotting ongoing.  She now has a concern of a rib abnormality on the right side and axillary lump.  It has been present for some time.      Past medical history, social history and family history was reviewed and unchanged from prior visit.    Review of Systems:    Review of Systems   Constitutional: Negative.    HENT:  Negative.    Eyes: Negative.    Respiratory: Negative.    Cardiovascular: Negative.    Gastrointestinal: Negative.    Endocrine: Positive for hot flashes.   Genitourinary: Negative.     Musculoskeletal: Negative.    Skin: Negative.    Neurological: Negative.    Hematological: Negative.    Psychiatric/Behavioral: Negative.       A comprehensive 14 point review of systems was performed and was negative except as mentioned.      Medications:        Current Outpatient Medications:   •  cetirizine  "(zyrTEC) 10 MG tablet, Take 10 mg by mouth As Needed for Allergies., Disp: , Rfl:   •  Cholecalciferol (VITAMIN D PO), Take  by mouth., Disp: , Rfl:   •  dexamethasone (DECADRON) 4 MG tablet, Take 5 tablets by mouth Daily With Breakfast., Disp: 20 tablet, Rfl: 0  •  dexamethasone (DECADRON) 4 MG tablet, Take 5 tablets by mouth once if the hands crack, Disp: 50 tablet, Rfl: 5  •  mometasone (ELOCON) 0.1 % cream, APPLY TO HANDS 2 TIMES A DAY FOR 3 TO 4 WEEKS, THEN TAKE A BREAK ...  (REFER TO PRESCRIPTION NOTES)., Disp: , Rfl: 0  •  Multiple Vitamins-Minerals (MULTIVITAMIN ADULT PO), Take 1 tablet by mouth Daily., Disp: , Rfl:   •  triamcinolone (KENALOG) 0.1 % cream, APPLY TO THE AFFECTED AREAS 2 TIMES A DAY FOR 2 TO 3 WEEKS, THEN ...  (REFER TO PRESCRIPTION NOTES)., Disp: , Rfl: 0  •  vitamin D (ERGOCALCIFEROL) 1.25 MG (63447 UT) capsule capsule, Take 1 capsule by mouth Every 7 (Seven) Days., Disp: 4 capsule, Rfl: 10  •  vitamin D (ERGOCALCIFEROL) 43973 units capsule capsule, Take 50,000 Units by mouth 1 (One) Time Per Week., Disp: , Rfl: 1  •  predniSONE (DELTASONE) 20 MG tablet, Take 20 mg by mouth 2 (Two) Times a Day. for 7 days, Disp: , Rfl: 0  •  tamoxifen (NOLVADEX) 20 MG chemo tablet, Take 1 tablet by mouth Daily., Disp: 90 tablet, Rfl: 4      ALLERGIES:  No Known Allergies      Physical Exam    VITAL SIGNS:  /67 Comment: LUE  Pulse 70   Temp 97.9 °F (36.6 °C) (Temporal)   Resp 16   Ht 168.9 cm (66.5\")   Wt 62.1 kg (137 lb)   SpO2 98% Comment: RA  BMI 21.78 kg/m²     Wt Readings from Last 3 Encounters:   07/22/20 62.1 kg (137 lb)   01/22/20 61.7 kg (136 lb)   10/21/19 62.1 kg (137 lb)       Body mass index is 21.78 kg/m². Body surface area is 1.71 meters squared.         Performance Status: 0    General: well appearing, in no acute distress  HEENT: sclera anicteric, oropharynx clear, neck is supple  Lymphatics: no cervical, supraclavicular, or axillary adenopathy  Cardiovascular: regular rate and " rhythm, no murmurs, rubs or gallops  Lungs: clear to auscultation bilaterally  Abdomen: soft, nontender, nondistended.  No palpable organomegaly  Extremities: no lower extremity edema  Skin: no rashes, lesions, bruising, or petechiae  Msk:  Shows no weakness of the large muscle groups  Psych: Mood is stable    Chest exam: Has tenderness at the site of her rib abnormality in the axillary lump appears to be fatty in nature.    RECENT LABS:    Lab Results   Component Value Date    HGB 12.8 08/14/2019    HCT 39.6 08/14/2019    MCV 87.4 08/14/2019     08/14/2019    WBC 5.11 08/14/2019       Mammo Post Clip Placement Right    Addendum Date: 7/30/2019    PATHOLOGY: Invasive ductal carcinoma, intermediate grade. Associated ductal carcinoma in situ, intermediate grade, cribriform pattern.  The pathology results are felt to be concordant with the imaging findings.  RECOMMENDATION: Recommend surgical consultation as well as preoperative breast MRI.  The patient will be notified of the results/recommendations by our breast imaging nurse.  This report was finalized on 7/30/2019 9:00 AM by Dr. Tonya Denson MD.      Us Guided Cyst Aspiration Breast    Addendum Date: 7/30/2019    CYTOLOGY: [facility].  The cytology results are felt to be concordant with the imaging findings.  RECOMMENDATION:  Recommend excisional biopsy.  The patient was notified of the results/recommendations by her breast imaging staff.  This report was finalized on 7/30/2019 9:00 AM by Dr. Tonya Denson MD.      Result Date: 7/30/2019  Fine-needle aspiration biopsy of a 0.4 cm probable complicated cyst 3:00 right breast. Cytology results are pending.  This report was finalized on 7/11/2019 8:55 AM by Dr. Tonya Denson MD.      Nm [place] Node Injection Only    Result Date: 8/15/2019  Pharmaceutical for injection into the right breast for sentinel mapping and lymphoscintigraphy for right breast carcinoma.  D:  08/15/2019 E:  08/15/2019     This report was  finalized on 8/15/2019 2:10 PM by Dr. Radha Briones MD.      Mammo Diagnostic Digital Tomosynthesis Right With Cad    Result Date: 7/11/2019  There is a persistent irregular isodense mass in the posterior right 3:00 position. Sonographically this correlates to an irregular appearing isointense mass as described above measuring 0.7 cm in size. Mammographically there is an adjacent smaller mass. This is seen sonographically as well and measures 0.4 cm on this modality. This smaller mass may reflect a small complicated cyst. The larger mass is concerning for small invasive carcinoma. Further evaluation with ultrasound-guided biopsy is recommended and will be performed today.  ACR BI-RADS CATEGORY:  5, HIGHLY SUGGESTIVE OF MALIGNANCY  RECOMMENDATION:  Recommend ultrasound-guided core biopsy of a 0.7 cm mass located in the 3 o'clock position of the right breast as described above as well as ultrasound guided aspiration of a possible 0.4 cm cyst also located at 3 o'clock as described above.  CAD was utilized.  The standard false-negative rate of mammography is between 10% and 25%. Complex patterns or increased breast density will markedly elevate the false-negative rate of mammography.    A letter, in lay terminology, with the results of this exam was given to the patient at the time of the visit.  __________________________________________________________ Physician Order  Ultrasound Guided Breast Biopsy  Diagnosis: Abnormal Mammogram  This report was finalized on 7/11/2019 9:02 AM by Dr. Tonya Denson MD.      Us Breast Right Limited    Result Date: 7/11/2019  There is a persistent irregular isodense mass in the posterior right 3:00 position. Sonographically this correlates to an irregular appearing isointense mass as described above measuring 0.7 cm in size. Mammographically there is an adjacent smaller mass. This is seen sonographically as well and measures 0.4 cm on this modality. This smaller mass may reflect a  small complicated cyst. The larger mass is concerning for small invasive carcinoma. Further evaluation with ultrasound-guided biopsy is recommended and will be performed today.  ACR BI-RADS CATEGORY:  5, HIGHLY SUGGESTIVE OF MALIGNANCY  RECOMMENDATION:  Recommend ultrasound-guided core biopsy of a 0.7 cm mass located in the 3 o'clock position of the right breast as described above as well as ultrasound guided aspiration of a possible 0.4 cm cyst also located at 3 o'clock as described above.  CAD was utilized.  The standard false-negative rate of mammography is between 10% and 25%. Complex patterns or increased breast density will markedly elevate the false-negative rate of mammography.    A letter, in lay terminology, with the results of this exam was given to the patient at the time of the visit.  __________________________________________________________ Physician Order  Ultrasound Guided Breast Biopsy  Diagnosis: Abnormal Mammogram  This report was finalized on 7/11/2019 9:02 AM by Dr. Tonya Denson MD.      Mammo Screening Digital Tomosynthesis Bilateral With Cad    Result Date: 7/3/2019  Findings right breast for which additional views are recommended.  ACR BI-RADS CATEGORY:  0, INCOMPLETE:   NEED ADDITIONAL IMAGING EVALUATION  RECOMMENDATION: Recommend right CC and MLO focal compression views as well as a right ML view. Also, the patient is a candidate for annual high-risk screening breast MRI based on the Cindi risk assessment model performed at time of screening.  CAD was utilized.  The standard false-negative rate of mammography is between 10% and 25%. Complex patterns or increased breast density will markedly elevate the false-negative rate of mammography.    A letter, in lay terminology, with the results of this exam will be mailed to the patient.   The patient will be contacted by our office to schedule for the additional imaging evaluation.  Please accept this as sufficient order for the additional  imaging evaluation.  ________________________________________________________________________ _______ Physicians Order  Diagnostic Mammogram with Breast Ultrasound if needed  Diagnosis: Abnormal Mammogram  This report was finalized on 7/3/2019 8:34 AM by Dr. Tonya Denson MD.      Us Guided Breast Biopsy With & Without Device Initial    Addendum Date: 7/30/2019    PATHOLOGY: Invasive ductal carcinoma, intermediate grade. Associated ductal carcinoma in situ, intermediate grade, cribriform pattern.  The pathology results are felt to be concordant with the imaging findings.  RECOMMENDATION: Recommend surgical consultation as well as preoperative breast MRI.  The patient will be notified of the results/recommendations by our breast imaging nurse.  This report was finalized on 7/30/2019 9:00 AM by Dr. Tonya Denson MD.            Assessment/Plan    1.  Stage I ER [place] positive HER-2 negative breast cancer in a premenopausal woman with low risk oncotype.  Discontinue tamoxifen due to significant side effects.  At this time she is still premenopausal and it is hard to put her on any other hormone blockade other than removing her ovaries or giving her Lupron.  I think the risk-benefit is fairly small.  So we will hold off on any further treatment from that standpoint.    2.  Axillary lump and rib abnormality.  This appears to be benign.  Though I think it is reasonable to get a CAT scan to make sure we not dealing with anything significant.      I spent a total of 15 minutes in direct patient care, greater than 10 minutes (greater than 50%) were spent in coordination of care, and counseling the patient regarding  Breast Cancer . Answered any questions patient had with medication and plan.      Tia Zelaya MD  [facility]    Return in (Approximately): 6 months    Orders Placed This Encounter   Procedures   • CT Chest Without Contrast       7/22/2020         Please note that portions of this  note may have been completed with a voice recognition program. Efforts were made to edit the dictations, but occasionally words are mistranscribed.

## 2021-01-06 ENCOUNTER — TELEPHONE (OUTPATIENT)
Dept: ONCOLOGY | Facility: CLINIC | Age: 46
End: 2021-01-06

## 2021-01-06 NOTE — TELEPHONE ENCOUNTER
Returned call to patient and at this time she asked if she really needed to see Dr. Fry. Patient stating she hasn't had issues. Discussed with patient she could do video visit. Patient stating she would think about it and get back to Dr. Fry.

## 2021-01-06 NOTE — TELEPHONE ENCOUNTER
Caller: NADJA MOLINA    Relationship to patient: SELF    Best call back number: [phone]    PT WOULD LIKE DR TRINH'S NURSE TO CALL HER WHEN POSSIBLE. WOULD NOT STATE WHAT CALL IS REGARDING

## 2021-01-14 ENCOUNTER — TELEPHONE (OUTPATIENT)
Dept: ONCOLOGY | Facility: CLINIC | Age: 46
End: 2021-01-14

## 2021-01-14 NOTE — TELEPHONE ENCOUNTER
Returned call to patient after discussing with Dr. Fry. Informing patient that Dr. Fry is fine with her doing video visit since she has my chart. Patient in agreement with it.

## 2021-01-14 NOTE — TELEPHONE ENCOUNTER
Patient wants to know if her appointment on 1/21/21 can be a phone visit, if she doesn't need any labs or scans? Please call.

## 2021-01-21 ENCOUNTER — TELEMEDICINE (OUTPATIENT)
Dept: ONCOLOGY | Facility: CLINIC | Age: 46
End: 2021-01-21

## 2021-01-21 ENCOUNTER — TELEPHONE (OUTPATIENT)
Dept: ONCOLOGY | Facility: CLINIC | Age: 46
End: 2021-01-21

## 2021-01-21 DIAGNOSIS — C50.811 MALIGNANT NEOPLASM OF OVERLAPPING SITES OF RIGHT BREAST IN FEMALE, ESTROGEN RECEPTOR POSITIVE (HCC): ICD-10-CM

## 2021-01-21 DIAGNOSIS — Z17.0 MALIGNANT NEOPLASM OF OVERLAPPING SITES OF RIGHT BREAST IN FEMALE, ESTROGEN RECEPTOR POSITIVE (HCC): ICD-10-CM

## 2021-01-21 DIAGNOSIS — C50.411 PRIMARY MALIGNANT NEOPLASM OF UPPER OUTER QUADRANT OF FEMALE BREAST, RIGHT (HCC): Primary | ICD-10-CM

## 2021-01-21 PROCEDURE — 99213 OFFICE O/P EST LOW 20 MIN: CPT | Performed by: INTERNAL MEDICINE

## 2021-01-21 RX ORDER — VENLAFAXINE HYDROCHLORIDE 37.5 MG/1
37.5 CAPSULE, EXTENDED RELEASE ORAL DAILY
Qty: 30 CAPSULE | Refills: 3 | Status: CANCELLED | OUTPATIENT
Start: 2021-01-21

## 2021-01-21 RX ORDER — VENLAFAXINE HYDROCHLORIDE 37.5 MG/1
37.5 CAPSULE, EXTENDED RELEASE ORAL DAILY
Qty: 30 CAPSULE | Refills: 3 | Status: SHIPPED | OUTPATIENT
Start: 2021-01-21 | End: 2021-02-11 | Stop reason: SDUPTHER

## 2021-01-21 NOTE — PROGRESS NOTES
"PROBLEM LIST:  Oncology/Hematology History   Primary malignant neoplasm of upper outer quadrant of female breast, right (CMS/HCC)   7/10/2019 Biopsy    Right breast     8/14/2019 Initial Diagnosis    Primary malignant neoplasm of upper outer quadrant of female breast, right (CMS/HCC)     8/14/2019 Surgery    Surgery       Procedure:  Bilateral mastectomy      Location:  Both breasts      Completeness of resection:  No evidence of residual tumor       10/21/2019 Cancer Staged    Cancer Staging  Primary malignant neoplasm of upper outer quadrant of female breast, right (CMS/HCC)  Staging form: Breast, AJCC 8th Edition  - Pathologic stage from 8/14/2019: Stage IA (pT1c, pN0, cM0, G2, ER: Positive, OR: Positive, HER2: Negative) - Signed by Tia Zelaya MD on 9/30/2019    Low risk Oncotype - 7     10/21/2019 -  Hormonal Therapy    Tamoxifen        TELEMEDICINE FOLLOW-UP     In order to limit face-to-face contact and enact \"social distancing\" in light of the COVID-19 outbreaks and in accordance to the recommendations per the CDC, WHO, and our individual department, Belinda Smallwood  was contacted.  Telemedicine was used to screen the patient for needs and conduct the patient's regularly scheduled follow-up.     COVID-19 screening: female specifically denies fever, body aches, cough, difficulty breathing, sore throat, runny nose, recent travel, or known sick exposures.      Belinda Smallwood was consented to undergo video televisit and was agreeable.    REASON FOR VISIT: Management of my breast cancer    HISTORY OF PRESENT ILLNESS:   45 y.o.  female presents today for follow-up of her breast cancer.  She could not tolerate tamoxifen.  It was causing a fair bit of mood changes and headaches.  She stopped it in May 2020.  She is concerned about significant mood shifts that been occurring.  She follows up with Dr. Heather Granger for her GYN issues.  But otherwise seems to be doing reasonably well.      Past " medical history, social history and family history was reviewed and unchanged from prior visit.    Review of Systems:    Review of Systems   Constitutional: Negative.    HENT:  Negative.    Eyes: Negative.    Respiratory: Negative.    Cardiovascular: Negative.    Gastrointestinal: Negative.    Endocrine: Positive for hot flashes.   Genitourinary: Negative.     Musculoskeletal: Negative.    Skin: Negative.    Neurological: Negative.    Hematological: Negative.    Psychiatric/Behavioral: Negative.         Labile moods      A comprehensive 14 point review of systems was performed and was negative except as mentioned.      Medications:        Current Outpatient Medications:   •  cetirizine (zyrTEC) 10 MG tablet, Take 10 mg by mouth As Needed for Allergies., Disp: , Rfl:   •  Cholecalciferol (VITAMIN D PO), Take  by mouth., Disp: , Rfl:   •  dexamethasone (DECADRON) 4 MG tablet, Take 5 tablets by mouth Daily With Breakfast., Disp: 20 tablet, Rfl: 0  •  dexamethasone (DECADRON) 4 MG tablet, Take 5 tablets by mouth once if the hands crack, Disp: 50 tablet, Rfl: 5  •  mometasone (ELOCON) 0.1 % cream, APPLY TO HANDS 2 TIMES A DAY FOR 3 TO 4 WEEKS, THEN TAKE A BREAK ...  (REFER TO PRESCRIPTION NOTES)., Disp: , Rfl: 0  •  Multiple Vitamins-Minerals (MULTIVITAMIN ADULT PO), Take 1 tablet by mouth Daily., Disp: , Rfl:   •  predniSONE (DELTASONE) 20 MG tablet, Take 20 mg by mouth 2 (Two) Times a Day. for 7 days, Disp: , Rfl: 0  •  tamoxifen (NOLVADEX) 20 MG chemo tablet, Take 1 tablet by mouth Daily., Disp: 90 tablet, Rfl: 4  •  triamcinolone (KENALOG) 0.1 % cream, APPLY TO THE AFFECTED AREAS 2 TIMES A DAY FOR 2 TO 3 WEEKS, THEN ...  (REFER TO PRESCRIPTION NOTES)., Disp: , Rfl: 0  •  venlafaxine XR (EFFEXOR-XR) 37.5 MG 24 hr capsule, Take 1 capsule by mouth Daily., Disp: 30 capsule, Rfl: 3  •  vitamin D (ERGOCALCIFEROL) 1.25 MG (96631 UT) capsule capsule, Take 1 capsule by mouth Every 7 (Seven) Days., Disp: 4 capsule, Rfl: 10  •   vitamin D (ERGOCALCIFEROL) 64539 units capsule capsule, Take 50,000 Units by mouth 1 (One) Time Per Week., Disp: , Rfl: 1      ALLERGIES:  No Known Allergies      Physical Exam    VITAL SIGNS:  There were no vitals taken for this visit.    Wt Readings from Last 3 Encounters:   07/22/20 62.1 kg (137 lb)   01/22/20 61.7 kg (136 lb)   10/21/19 62.1 kg (137 lb)       There is no height or weight on file to calculate BMI. There is no height or weight on file to calculate BSA.         Performance Status: 0      RECENT LABS:    Lab Results   Component Value Date    HGB 12.8 08/14/2019    HCT 39.6 08/14/2019    MCV 87.4 08/14/2019     08/14/2019    WBC 5.11 08/14/2019       Mammo Post Clip Placement Right    Addendum Date: 7/30/2019    PATHOLOGY: Invasive ductal carcinoma, intermediate grade. Associated ductal carcinoma in situ, intermediate grade, cribriform pattern.  The pathology results are felt to be concordant with the imaging findings.  RECOMMENDATION: Recommend surgical consultation as well as preoperative breast MRI.  The patient will be notified of the results/recommendations by our breast imaging nurse.  This report was finalized on 7/30/2019 9:00 AM by Dr. Tonya Denson MD.      Us Guided Cyst Aspiration Breast    Addendum Date: 7/30/2019    CYTOLOGY: [facility].  The cytology results are felt to be concordant with the imaging findings.  RECOMMENDATION:  Recommend excisional biopsy.  The patient was notified of the results/recommendations by her breast imaging staff.  This report was finalized on 7/30/2019 9:00 AM by Dr. Tonya Denson MD.      Result Date: 7/30/2019  Fine-needle aspiration biopsy of a 0.4 cm probable complicated cyst 3:00 right breast. Cytology results are pending.  This report was finalized on 7/11/2019 8:55 AM by Dr. Tonya Denson MD.      Nm Marilla Node Injection Only    Result Date: 8/15/2019  Pharmaceutical for injection into the right breast for sentinel mapping and  lymphoscintigraphy for right breast carcinoma.  D:  08/15/2019 E:  08/15/2019     This report was finalized on 8/15/2019 2:10 PM by Dr. Radha Briones MD.      Mammo Diagnostic Digital Tomosynthesis Right With Cad    Result Date: 7/11/2019  There is a persistent irregular isodense mass in the posterior right 3:00 position. Sonographically this correlates to an irregular appearing isointense mass as described above measuring 0.7 cm in size. Mammographically there is an adjacent smaller mass. This is seen sonographically as well and measures 0.4 cm on this modality. This smaller mass may reflect a small complicated cyst. The larger mass is concerning for small invasive carcinoma. Further evaluation with ultrasound-guided biopsy is recommended and will be performed today.  ACR BI-RADS CATEGORY:  5, HIGHLY SUGGESTIVE OF MALIGNANCY  RECOMMENDATION:  Recommend ultrasound-guided core biopsy of a 0.7 cm mass located in the 3 o'clock position of the right breast as described above as well as ultrasound guided aspiration of a possible 0.4 cm cyst also located at 3 o'clock as described above.  CAD was utilized.  The standard false-negative rate of mammography is between 10% and 25%. Complex patterns or increased breast density will markedly elevate the false-negative rate of mammography.    A letter, in lay terminology, with the results of this exam was given to the patient at the time of the visit.  __________________________________________________________ Physician Order  Ultrasound Guided Breast Biopsy  Diagnosis: Abnormal Mammogram  This report was finalized on 7/11/2019 9:02 AM by Dr. Tonya Denson MD.      Us Breast Right Limited    Result Date: 7/11/2019  There is a persistent irregular isodense mass in the posterior right 3:00 position. Sonographically this correlates to an irregular appearing isointense mass as described above measuring 0.7 cm in size. Mammographically there is an adjacent smaller mass. This is  seen sonographically as well and measures 0.4 cm on this modality. This smaller mass may reflect a small complicated cyst. The larger mass is concerning for small invasive carcinoma. Further evaluation with ultrasound-guided biopsy is recommended and will be performed today.  ACR BI-RADS CATEGORY:  5, HIGHLY SUGGESTIVE OF MALIGNANCY  RECOMMENDATION:  Recommend ultrasound-guided core biopsy of a 0.7 cm mass located in the 3 o'clock position of the right breast as described above as well as ultrasound guided aspiration of a possible 0.4 cm cyst also located at 3 o'clock as described above.  CAD was utilized.  The standard false-negative rate of mammography is between 10% and 25%. Complex patterns or increased breast density will markedly elevate the false-negative rate of mammography.    A letter, in lay terminology, with the results of this exam was given to the patient at the time of the visit.  __________________________________________________________ Physician Order  Ultrasound Guided Breast Biopsy  Diagnosis: Abnormal Mammogram  This report was finalized on 7/11/2019 9:02 AM by Dr. Tonya Denson MD.      Mammo Screening Digital Tomosynthesis Bilateral With Cad    Result Date: 7/3/2019  Findings right breast for which additional views are recommended.  ACR BI-RADS CATEGORY:  0, INCOMPLETE:   NEED ADDITIONAL IMAGING EVALUATION  RECOMMENDATION: Recommend right CC and MLO focal compression views as well as a right ML view. Also, the patient is a candidate for annual high-risk screening breast MRI based on the Cindi risk assessment model performed at time of screening.  CAD was utilized.  The standard false-negative rate of mammography is between 10% and 25%. Complex patterns or increased breast density will markedly elevate the false-negative rate of mammography.    A letter, in lay terminology, with the results of this exam will be mailed to the patient.   The patient will be contacted by our office to schedule for  the additional imaging evaluation.  Please accept this as sufficient order for the additional imaging evaluation.  ________________________________________________________________________ _______ Physicians Order  Diagnostic Mammogram with Breast Ultrasound if needed  Diagnosis: Abnormal Mammogram  This report was finalized on 7/3/2019 8:34 AM by Dr. Tonya Denson MD.      Us Guided Breast Biopsy With & Without Device Initial    Addendum Date: 7/30/2019    PATHOLOGY: Invasive ductal carcinoma, intermediate grade. Associated ductal carcinoma in situ, intermediate grade, cribriform pattern.  The pathology results are felt to be concordant with the imaging findings.  RECOMMENDATION: Recommend surgical consultation as well as preoperative breast MRI.  The patient will be notified of the results/recommendations by our breast imaging nurse.  This report was finalized on 7/30/2019 9:00 AM by Dr. Tonya Denson MD.            Assessment/Plan    1.  Stage I ER DE positive HER-2 negative breast cancer in a premenopausal woman with low risk oncotype.  No clinical sign of recurrence at this point.  Continue observation only.  She is not on any adjuvant therapy due to side effects.    2.  Significant mood lability.  I think it is related to her menopausal state.  I am going to place her on Effexor 37.5 mg/day and this can be increased as needed.  Unfortunately she cannot be on hormone replacement therapy due to her history of ER positive breast cancer.    I spent a total of 15 minutes in direct patient care spent in coordination of care, and counseling the patient regarding  Breast Cancer . Answered any questions patient had with medication and plan.      Tia Zelaya MD  [facility] Hematology and Oncology    Return in (Approximately): 6 months    No orders of the defined types were placed in this encounter.      1/21/2021

## 2021-02-11 RX ORDER — VENLAFAXINE HYDROCHLORIDE 75 MG/1
75 CAPSULE, EXTENDED RELEASE ORAL DAILY
Qty: 30 CAPSULE | Refills: 5 | Status: SHIPPED | OUTPATIENT
Start: 2021-02-11 | End: 2021-07-21 | Stop reason: DRUGHIGH

## 2021-07-09 ENCOUNTER — APPOINTMENT (OUTPATIENT)
Dept: CT IMAGING | Facility: HOSPITAL | Age: 46
End: 2021-07-09

## 2021-07-09 ENCOUNTER — HOSPITAL ENCOUNTER (EMERGENCY)
Facility: HOSPITAL | Age: 46
Discharge: HOME | End: 2021-07-10
Attending: EMERGENCY MEDICINE

## 2021-07-09 ENCOUNTER — APPOINTMENT (OUTPATIENT)
Dept: GENERAL RADIOLOGY | Facility: HOSPITAL | Age: 46
End: 2021-07-09

## 2021-07-09 VITALS
OXYGEN SATURATION: 100 % | DIASTOLIC BLOOD PRESSURE: 71 MMHG | HEART RATE: 72 BPM | SYSTOLIC BLOOD PRESSURE: 102 MMHG | BODY MASS INDEX: 22.76 KG/M2 | RESPIRATION RATE: 21 BRPM | HEIGHT: 67 IN | WEIGHT: 145 LBS | TEMPERATURE: 99.2 F

## 2021-07-09 DIAGNOSIS — M50.20 HNP (HERNIATED NUCLEUS PULPOSUS), CERVICAL: Primary | ICD-10-CM

## 2021-07-09 DIAGNOSIS — R07.89 ATYPICAL CHEST PAIN: ICD-10-CM

## 2021-07-09 DIAGNOSIS — M54.12 CERVICAL RADICULOPATHY: ICD-10-CM

## 2021-07-09 LAB
ALBUMIN SERPL-MCNC: 4.6 G/DL (ref 3.5–5.2)
ALBUMIN/GLOB SERPL: 1.6 G/DL
ALP SERPL-CCNC: 66 U/L (ref 39–117)
ALT SERPL W P-5'-P-CCNC: 21 U/L (ref 1–33)
ANION GAP SERPL CALCULATED.3IONS-SCNC: 10 MMOL/L (ref 5–15)
AST SERPL-CCNC: 31 U/L (ref 1–32)
B-HCG UR QL: NEGATIVE
BACTERIA UR QL AUTO: ABNORMAL /HPF
BASOPHILS # BLD AUTO: 0.04 10*3/MM3 (ref 0–0.2)
BASOPHILS NFR BLD AUTO: 0.5 % (ref 0–1.5)
BILIRUB SERPL-MCNC: 0.2 MG/DL (ref 0–1.2)
BILIRUB UR QL STRIP: NEGATIVE
BUN SERPL-MCNC: 15 MG/DL (ref 6–20)
BUN/CREAT SERPL: 22.7 (ref 7–25)
CALCIUM SPEC-SCNC: 10 MG/DL (ref 8.6–10.5)
CHLORIDE SERPL-SCNC: 106 MMOL/L (ref 98–107)
CLARITY UR: ABNORMAL
CO2 SERPL-SCNC: 23 MMOL/L (ref 22–29)
COLOR UR: YELLOW
CREAT SERPL-MCNC: 0.66 MG/DL (ref 0.57–1)
DEPRECATED RDW RBC AUTO: 43.8 FL (ref 37–54)
EOSINOPHIL # BLD AUTO: 0.02 10*3/MM3 (ref 0–0.4)
EOSINOPHIL NFR BLD AUTO: 0.3 % (ref 0.3–6.2)
ERYTHROCYTE [DISTWIDTH] IN BLOOD BY AUTOMATED COUNT: 15 % (ref 12.3–15.4)
GFR SERPL CREATININE-BSD FRML MDRD: 96 ML/MIN/1.73
GLOBULIN UR ELPH-MCNC: 2.9 GM/DL
GLUCOSE SERPL-MCNC: 98 MG/DL (ref 65–99)
GLUCOSE UR STRIP-MCNC: NEGATIVE MG/DL
HCT VFR BLD AUTO: 36.1 % (ref 34–46.6)
HGB BLD-MCNC: 11.5 G/DL (ref 12–15.9)
HGB UR QL STRIP.AUTO: NEGATIVE
HOLD SPECIMEN: NORMAL
HYALINE CASTS UR QL AUTO: ABNORMAL /LPF
IMM GRANULOCYTES # BLD AUTO: 0.01 10*3/MM3 (ref 0–0.05)
IMM GRANULOCYTES NFR BLD AUTO: 0.1 % (ref 0–0.5)
INTERNAL NEGATIVE CONTROL: NEGATIVE
INTERNAL POSITIVE CONTROL: POSITIVE
KETONES UR QL STRIP: ABNORMAL
LEUKOCYTE ESTERASE UR QL STRIP.AUTO: ABNORMAL
LIPASE SERPL-CCNC: 40 U/L (ref 13–60)
LYMPHOCYTES # BLD AUTO: 2.13 10*3/MM3 (ref 0.7–3.1)
LYMPHOCYTES NFR BLD AUTO: 27.4 % (ref 19.6–45.3)
Lab: NORMAL
MCH RBC QN AUTO: 25.4 PG (ref 26.6–33)
MCHC RBC AUTO-ENTMCNC: 31.9 G/DL (ref 31.5–35.7)
MCV RBC AUTO: 79.7 FL (ref 79–97)
MONOCYTES # BLD AUTO: 0.65 10*3/MM3 (ref 0.1–0.9)
MONOCYTES NFR BLD AUTO: 8.4 % (ref 5–12)
NEUTROPHILS NFR BLD AUTO: 4.92 10*3/MM3 (ref 1.7–7)
NEUTROPHILS NFR BLD AUTO: 63.3 % (ref 42.7–76)
NITRITE UR QL STRIP: NEGATIVE
NRBC BLD AUTO-RTO: 0 /100 WBC (ref 0–0.2)
NT-PROBNP SERPL-MCNC: 36.6 PG/ML (ref 0–450)
PH UR STRIP.AUTO: 7 [PH] (ref 5–8)
PLATELET # BLD AUTO: 248 10*3/MM3 (ref 140–450)
PMV BLD AUTO: 9.6 FL (ref 6–12)
POTASSIUM SERPL-SCNC: 3.5 MMOL/L (ref 3.5–5.2)
PROT SERPL-MCNC: 7.5 G/DL (ref 6–8.5)
PROT UR QL STRIP: NEGATIVE
QT INTERVAL: 406 MS
QTC INTERVAL: 425 MS
RBC # BLD AUTO: 4.53 10*6/MM3 (ref 3.77–5.28)
RBC # UR: ABNORMAL /HPF
REF LAB TEST METHOD: ABNORMAL
SODIUM SERPL-SCNC: 139 MMOL/L (ref 136–145)
SP GR UR STRIP: >=1.03 (ref 1–1.03)
SQUAMOUS #/AREA URNS HPF: ABNORMAL /HPF
TROPONIN T SERPL-MCNC: <0.01 NG/ML (ref 0–0.03)
TROPONIN T SERPL-MCNC: <0.01 NG/ML (ref 0–0.03)
UROBILINOGEN UR QL STRIP: ABNORMAL
WBC # BLD AUTO: 7.77 10*3/MM3 (ref 3.4–10.8)
WBC UR QL AUTO: ABNORMAL /HPF
WHOLE BLOOD HOLD SPECIMEN: NORMAL

## 2021-07-09 PROCEDURE — 96374 THER/PROPH/DIAG INJ IV PUSH: CPT

## 2021-07-09 PROCEDURE — 96375 TX/PRO/DX INJ NEW DRUG ADDON: CPT

## 2021-07-09 PROCEDURE — 72125 CT NECK SPINE W/O DYE: CPT

## 2021-07-09 PROCEDURE — 83880 ASSAY OF NATRIURETIC PEPTIDE: CPT | Performed by: EMERGENCY MEDICINE

## 2021-07-09 PROCEDURE — 70450 CT HEAD/BRAIN W/O DYE: CPT

## 2021-07-09 PROCEDURE — 81001 URINALYSIS AUTO W/SCOPE: CPT | Performed by: EMERGENCY MEDICINE

## 2021-07-09 PROCEDURE — 84484 ASSAY OF TROPONIN QUANT: CPT | Performed by: EMERGENCY MEDICINE

## 2021-07-09 PROCEDURE — 83690 ASSAY OF LIPASE: CPT | Performed by: EMERGENCY MEDICINE

## 2021-07-09 PROCEDURE — 85025 COMPLETE CBC W/AUTO DIFF WBC: CPT | Performed by: EMERGENCY MEDICINE

## 2021-07-09 PROCEDURE — 93005 ELECTROCARDIOGRAM TRACING: CPT | Performed by: EMERGENCY MEDICINE

## 2021-07-09 PROCEDURE — 71045 X-RAY EXAM CHEST 1 VIEW: CPT

## 2021-07-09 PROCEDURE — 25010000002 KETOROLAC TROMETHAMINE PER 15 MG: Performed by: EMERGENCY MEDICINE

## 2021-07-09 PROCEDURE — 80053 COMPREHEN METABOLIC PANEL: CPT | Performed by: EMERGENCY MEDICINE

## 2021-07-09 PROCEDURE — 99284 EMERGENCY DEPT VISIT MOD MDM: CPT

## 2021-07-09 PROCEDURE — 25010000002 ONDANSETRON PER 1 MG: Performed by: EMERGENCY MEDICINE

## 2021-07-09 PROCEDURE — 81025 URINE PREGNANCY TEST: CPT | Performed by: EMERGENCY MEDICINE

## 2021-07-09 RX ORDER — KETOROLAC TROMETHAMINE 15 MG/ML
15 INJECTION, SOLUTION INTRAMUSCULAR; INTRAVENOUS ONCE
Status: COMPLETED | OUTPATIENT
Start: 2021-07-09 | End: 2021-07-09

## 2021-07-09 RX ORDER — ASPIRIN 81 MG/1
324 TABLET, CHEWABLE ORAL ONCE
Status: COMPLETED | OUTPATIENT
Start: 2021-07-09 | End: 2021-07-09

## 2021-07-09 RX ORDER — ONDANSETRON 2 MG/ML
4 INJECTION INTRAMUSCULAR; INTRAVENOUS ONCE
Status: COMPLETED | OUTPATIENT
Start: 2021-07-09 | End: 2021-07-09

## 2021-07-09 RX ORDER — SODIUM CHLORIDE 0.9 % (FLUSH) 0.9 %
10 SYRINGE (ML) INJECTION AS NEEDED
Status: DISCONTINUED | OUTPATIENT
Start: 2021-07-09 | End: 2021-07-10 | Stop reason: HOSPADM

## 2021-07-09 RX ADMIN — ASPIRIN 81 MG CHEWABLE TABLET 324 MG: 81 TABLET CHEWABLE at 19:37

## 2021-07-09 RX ADMIN — KETOROLAC TROMETHAMINE 15 MG: 15 INJECTION, SOLUTION INTRAMUSCULAR; INTRAVENOUS at 21:47

## 2021-07-09 RX ADMIN — ONDANSETRON 4 MG: 2 INJECTION INTRAMUSCULAR; INTRAVENOUS at 21:47

## 2021-07-10 LAB
QT INTERVAL: 354 MS
QTC INTERVAL: 451 MS

## 2021-07-10 NOTE — ED PROVIDER NOTES
Subjective   Ms. Smallwood is a 47 yo female who was having an ordinary day today until about 1700 when she had sudden, intense posterior headache and right posteriolateral neck pain radiating down her entire RLE. The pain made her nauseated. She has a history of migraines but this didn't feel like one. There is tenderness when putting pressure on the back of her neck. She didn't take anything for the pain. At the same time she began to experience a midchest tightness, something she has never experienced before. She has no risk factors for IHD. Nothing affects the pain; that is, it is not changed by breathing, movement, exertion, or rest. It is just there. No radiation.  Past history significant for breast cancer under therapy, apparently in remission. Taking mental health meds to stabilize mood related to menopause for which she can't take hormone therapy.      History provided by:  Patient and medical records      Review of Systems   Constitutional: Negative.  Negative for chills and fever.   Respiratory: Negative.  Negative for shortness of breath.    Cardiovascular: Positive for chest pain. Negative for palpitations and leg swelling.   Gastrointestinal: Positive for nausea.   Musculoskeletal: Positive for neck pain.   Neurological: Positive for numbness (RUE) and headaches.   Psychiatric/Behavioral:        Menopausal mood problems   All other systems reviewed and are negative.      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   • MASTECTOMY W/ SENTINEL NODE BIOPSY Bilateral 8/14/2019    Procedure: BREAST MASTECTOMY BILATERAL WITH RIGHT SENTINEL NODE BIOPSY;  Surgeon: Constantine Hurst MD;  Location:   [facility];  Service: General   • SHOULDER ARTHROSCOPY Right 1992   • US GUIDED CYST ASPIRATION BREAST N/A 7/10/2019   • WISDOM TOOTH EXTRACTION         Family History   Problem Relation Age of Onset   • Breast cancer Mother 65   • Thyroid disease Mother    • Lung cancer Mother    • Breast cancer Maternal Grandmother         unknown onset   • Ovarian cancer Neg Hx        Social History     Socioeconomic History   • Marital status:      Spouse name: Not on file   • Number of children: Not on file   • Years of education: Not on file   • Highest education level: Not on file   Tobacco Use   • Smoking status: Never Smoker   • Smokeless tobacco: Never Used   Substance and Sexual Activity   • Alcohol use: No   • Drug use: No   • Sexual activity: Defer           Objective   Physical Exam  Vitals and nursing note reviewed.   Constitutional:       Appearance: Normal appearance.      Comments: Slender, uncomfortable female   HENT:      Head: Atraumatic.      Mouth/Throat:      Comments: Airway patent  Eyes:      Conjunctiva/sclera: Conjunctivae normal.   Neck:      Trachea: Phonation normal.   Cardiovascular:      Rate and Rhythm: Normal rate and regular rhythm.      Heart sounds: Normal heart sounds.   Pulmonary:      Effort: Pulmonary effort is normal. No respiratory distress.      Breath sounds: Normal breath sounds.   Chest:      Chest wall: No tenderness.   Abdominal:      Palpations: Abdomen is soft.      Tenderness: There is no abdominal tenderness.   Musculoskeletal:      Cervical back: Neck supple. Tenderness (along nuchal ridge and over right paraspinous muscles) present. No rigidity.      Right lower leg: No edema.      Left lower leg: No edema.      Comments: No right trapezius or SCM tenderness.   Skin:     General: Skin is warm and dry.   Neurological:      Mental Status: She is alert and oriented to person, place, and time.      Comments: Right  strength slightly diminished compared to normal left   strength.  She has decreased sensation to light touch across right hand compared to left. Normal sensation remainder of RUE.  Median, radial, and ulnar motor testing all normal.   Psychiatric:         Behavior: Behavior normal.         Procedures           ED Course  ED Course as of Jul 10 0041   Fri Jul 09, 2021   2123 She initially refused anything for pain, but is now wincing and hurting and wishes something non-sedating. Still nauseated. Will treat. Will need second troponin and ECG.    [LI]   2305 UA appears contaminated and she has no UTI symptoms. Low suspicion for IHD with no reason to suggest further cardiac evaluation.    [LI]   2306 HEART score = 1 for age.  Her  related strange behavior to the nurse with his concerns that she is having mental health problems, often absent from home, not taking care of the children. She states that all is OK at home and that she is also OK mentally.    [LI]   2314 The Toradol was quite helpful for her neck pain and headache.    [LI]      ED Course User Index  [LI] Erik Smith MD      Recent Results (from the past 24 hour(s))   ECG 12 Lead    Collection Time: 07/09/21  7:26 PM   Result Value Ref Range    QT Interval 354 ms    QTC Interval 451 ms   Troponin    Collection Time: 07/09/21  7:30 PM    Specimen: Blood   Result Value Ref Range    Troponin T <0.010 0.000 - 0.030 ng/mL   Comprehensive Metabolic Panel    Collection Time: 07/09/21  7:30 PM    Specimen: Blood   Result Value Ref Range    Glucose 98 65 - 99 mg/dL    BUN 15 6 - 20 mg/dL    Creatinine 0.66 0.57 - 1.00 mg/dL    Sodium 139 136 - 145 mmol/L    Potassium 3.5 3.5 - 5.2 mmol/L    Chloride 106 98 - 107 mmol/L    CO2 23.0 22.0 - 29.0 mmol/L    Calcium 10.0 8.6 - 10.5 mg/dL    Total Protein 7.5 6.0 - 8.5 g/dL    Albumin 4.60 3.50 - 5.20 g/dL    ALT (SGPT) 21 1 - 33 U/L    AST (SGOT) 31 1 - 32 U/L    Alkaline Phosphatase 66 39 - 117 U/L    Total Bilirubin 0.2 0.0 - 1.2 mg/dL    eGFR Non  Amer 96  >60 mL/min/1.73    Globulin 2.9 gm/dL    A/G Ratio 1.6 g/dL    BUN/Creatinine Ratio 22.7 7.0 - 25.0    Anion Gap 10.0 5.0 - 15.0 mmol/L   Lipase    Collection Time: 07/09/21  7:30 PM    Specimen: Blood   Result Value Ref Range    Lipase 40 13 - 60 U/L   BNP    Collection Time: 07/09/21  7:30 PM    Specimen: Blood   Result Value Ref Range    proBNP 36.6 0.0 - 450.0 pg/mL   Green Top (Gel)    Collection Time: 07/09/21  7:30 PM   Result Value Ref Range    Extra Tube Hold for add-ons.    Lavender Top    Collection Time: 07/09/21  7:30 PM   Result Value Ref Range    Extra Tube hold for add-on    Gold Top - SST    Collection Time: 07/09/21  7:30 PM   Result Value Ref Range    Extra Tube Hold for add-ons.    Gray Top    Collection Time: 07/09/21  7:30 PM   Result Value Ref Range    Extra Tube Hold for add-ons.    CBC Auto Differential    Collection Time: 07/09/21  7:30 PM    Specimen: Blood   Result Value Ref Range    WBC 7.77 3.40 - 10.80 10*3/mm3    RBC 4.53 3.77 - 5.28 10*6/mm3    Hemoglobin 11.5 (L) 12.0 - 15.9 g/dL    Hematocrit 36.1 34.0 - 46.6 %    MCV 79.7 79.0 - 97.0 fL    MCH 25.4 (L) 26.6 - 33.0 pg    MCHC 31.9 31.5 - 35.7 g/dL    RDW 15.0 12.3 - 15.4 %    RDW-SD 43.8 37.0 - 54.0 fl    MPV 9.6 6.0 - 12.0 fL    Platelets 248 140 - 450 10*3/mm3    Neutrophil % 63.3 42.7 - 76.0 %    Lymphocyte % 27.4 19.6 - 45.3 %    Monocyte % 8.4 5.0 - 12.0 %    Eosinophil % 0.3 0.3 - 6.2 %    Basophil % 0.5 0.0 - 1.5 %    Immature Grans % 0.1 0.0 - 0.5 %    Neutrophils, Absolute 4.92 1.70 - 7.00 10*3/mm3    Lymphocytes, Absolute 2.13 0.70 - 3.10 10*3/mm3    Monocytes, Absolute 0.65 0.10 - 0.90 10*3/mm3    Eosinophils, Absolute 0.02 0.00 - 0.40 10*3/mm3    Basophils, Absolute 0.04 0.00 - 0.20 10*3/mm3    Immature Grans, Absolute 0.01 0.00 - 0.05 10*3/mm3    nRBC 0.0 0.0 - 0.2 /100 WBC   Urinalysis With Microscopic If Indicated (No Culture) - Urine, Clean Catch    Collection Time: 07/09/21  9:01 PM    Specimen: Urine, Clean Catch    Result Value Ref Range    Color, UA Yellow Yellow, Straw    Appearance, UA Cloudy (A) Clear    pH, UA 7.0 5.0 - 8.0    Specific Gravity, UA >=1.030 1.001 - 1.030    Glucose, UA Negative Negative    Ketones, UA Trace (A) Negative    Bilirubin, UA Negative Negative    Blood, UA Negative Negative    Protein, UA Negative Negative    Leuk Esterase, UA Trace (A) Negative    Nitrite, UA Negative Negative    Urobilinogen, UA 1.0 E.U./dL 0.2 - 1.0 E.U./dL   Urinalysis, Microscopic Only - Urine, Clean Catch    Collection Time: 07/09/21  9:01 PM    Specimen: Urine, Clean Catch   Result Value Ref Range    RBC, UA 3-6 (A) None Seen, 0-2 /HPF    WBC, UA 6-12 (A) None Seen, 0-2 /HPF    Bacteria, UA 2+ (A) None Seen, Trace /HPF    Squamous Epithelial Cells, UA 7-12 (A) None Seen, 0-2 /HPF    Hyaline Casts, UA 0-6 0 - 6 /LPF    Methodology Automated Microscopy    POCT pregnancy, urine    Collection Time: 07/09/21  9:14 PM    Specimen: Urine   Result Value Ref Range    HCG, Urine, QL Negative Negative    Lot Number [id number]     Internal Positive Control Positive Positive    Internal Negative Control Negative Negative   ECG 12 Lead    Collection Time: 07/09/21  9:38 PM   Result Value Ref Range    QT Interval 406 ms    QTC Interval 425 ms   Troponin    Collection Time: 07/09/21  9:40 PM    Specimen: Blood   Result Value Ref Range    Troponin T <0.010 0.000 - 0.030 ng/mL     Note: In addition to lab results from this visit, the labs listed above may include labs taken at another facility or during a different encounter within the last 24 hours. Please correlate lab times with ED admission and discharge times for further clarification of the services performed during this visit.    CT Head Without Contrast   Final Result   Normal, negative unenhanced head CT.               Signer Name: Sina Martinez MD    Signed: 7/9/2021 8:45 PM    Workstation Name: [email or web address]     [facility]      CT Cervical Spine Without  Contrast   Final Result   C5-C6 degenerative disc disease with posterior disc bulging and osteophyte contributing to mild spinal stenosis.      Signer Name: JACOB Quijano MD    Signed: 7/9/2021 8:48 PM    Workstation Name: [facility]     [facility]iology [facility]      XR Chest 1 View   Final Result   No acute cardiopulmonary findings.      Signer Name: JACOB Quijano MD    Signed: 7/9/2021 8:10 PM    Workstation Name: [facility]     [facility]        Vitals:    07/09/21 2230 07/09/21 2235 07/09/21 2330 07/09/21 2348   BP: 101/74  102/71    BP Location:       Patient Position:       Pulse: 70 89 70 72   Resp:       Temp:       TempSrc:       SpO2: 98% 100% 99% 100%   Weight:       Height:         Medications   sodium chloride 0.9 % flush 10 mL (has no administration in time range)   aspirin chewable tablet 324 mg (324 mg Oral Given 7/9/21 1937)   ketorolac (TORADOL) injection 15 mg (15 mg Intravenous Given 7/9/21 2147)   ondansetron (ZOFRAN) injection 4 mg (4 mg Intravenous Given 7/9/21 2147)     ECG/EMG Results (last 24 hours)     Procedure Component Value Units Date/Time    ECG 12 Lead [[id number]] Collected: 07/09/21 2138     Updated: 07/09/21 2157     QT Interval 406 ms      QTC Interval 425 ms     Narrative:      Test Reason : chest pain  Blood Pressure :   */*   mmHG  Vent. Rate :  66 BPM     Atrial Rate :  66 BPM     P-R Int : 126 ms          QRS Dur :  84 ms      QT Int : 406 ms       P-R-T Axes :  76  63  45 degrees     QTc Int : 425 ms    Normal sinus rhythm  Normal ECG  When compared with ECG of 09-JUL-2021 19:26, (Unconfirmed)  OH interval has increased  Vent. rate has decreased BY  32 BPM    Confirmed by VANDA PHILLIPS MD ([id number]) on 7/9/2021 9:57:02 PM    Referred By: LORI BAR           Confirmed By: VANDA PHILLIPS MD    ECG 12 Lead [[id number]] Collected: 07/09/21 1926     Updated: 07/10/21 0016     QT Interval 354 ms      QTC Interval 451 ms     Narrative:       Test Reason : chest pain  Blood Pressure :   */*   mmHG  Vent. Rate :  98 BPM     Atrial Rate :  98 BPM     P-R Int :  96 ms          QRS Dur :  82 ms      QT Int : 354 ms       P-R-T Axes :  60  62  34 degrees     QTc Int : 451 ms    Sinus rhythm with short AZ  Otherwise normal ECG  No previous ECGs available  Confirmed by VANDA PHILLIPS MD ([id number]) on 7/10/2021 12:16:21 AM    Referred By: EDMD           Confirmed By: VANDA PHILLIPS MD        ECG 12 Lead   Final Result   Test Reason : chest pain   Blood Pressure :   */*   mmHG   Vent. Rate :  66 BPM     Atrial Rate :  66 BPM      P-R Int : 126 ms          QRS Dur :  84 ms       QT Int : 406 ms       P-R-T Axes :  76  63  45 degrees      QTc Int : 425 ms      Normal sinus rhythm   Normal ECG   When compared with ECG of 09-JUL-2021 19:26, (Unconfirmed)   AZ interval has increased   Vent. rate has decreased BY  32 BPM      Confirmed by VANDA PHILLIPS MD ([id number]) on 7/9/2021 9:57:02 PM      Referred By: ED MD           Confirmed By: VANDA PHILLIPS MD      ECG 12 Lead   Final Result   Test Reason : chest pain   Blood Pressure :   */*   mmHG   Vent. Rate :  98 BPM     Atrial Rate :  98 BPM      P-R Int :  96 ms          QRS Dur :  82 ms       QT Int : 354 ms       P-R-T Axes :  60  62  34 degrees      QTc Int : 451 ms      Sinus rhythm with short AZ   Otherwise normal ECG   No previous ECGs available   Confirmed by VANDA PHILLIPS MD ([id number]) on 7/10/2021 12:16:21 AM      Referred By: EDMD           Confirmed By: VANDA PHILLIPS MD                                                 [facility]    Final diagnoses:   HNP (herniated nucleus pulposus), cervical   Cervical radiculopathy   Atypical chest pain       ED Disposition  ED Disposition     ED Disposition Condition Comment    Discharge Stable           Cordelia Rincon MD  [address] [place] [address] [address] [place] [address] [phone]    Go in 1 week  As needed if continued neck pain, headache, or right arm problems    [facility]  [place] [facility]  [address] [facility] [address] [phone]    If symptoms worsen         Medication List      Changed    ergocalciferol 1.25 MG (24097 UT) capsule  Commonly known as: ERGOCALCIFEROL  Take 1 capsule by mouth Every 7 (Seven) Days.  What changed: Another medication with the same name was removed. Continue taking this medication, and follow the directions you see here.        Stop    dexamethasone 4 MG tablet  Commonly known as: DECADRON     multivitamin with minerals tablet tablet     predniSONE 20 MG tablet  Commonly known as: DELTASONE     tamoxifen 20 MG chemo tablet  Commonly known as: NOLVADEX     triamcinolone 0.1 % cream  Commonly known as: KENALOG     VITAMIN D PO             Erik Smith MD  07/10/21 0042

## 2021-07-10 NOTE — DISCHARGE INSTRUCTIONS
You can use ibupofen 600 mg up to four times daily for the neck pain and headache.  Regarding the chest pain, it is highly unlikely to be from your heart. However, if you start having chest pain or other problems like difficulty breathing or nausea with exertion that goes away with rest, you need to be rechecked promptly.    Please review the medications you are supposed to be taking according to prior physician recommendations. I have not changed your home medications during this visit. If your discharge instructions indicate that I have changed your home medications, this is not the case, and you should disregard. If you have any questions about the medication you should be taking at home, please call your physician.

## 2021-07-12 ENCOUNTER — NURSE NAVIGATOR (OUTPATIENT)
Dept: ONCOLOGY | Facility: CLINIC | Age: 46
End: 2021-07-12

## 2021-07-12 DIAGNOSIS — C50.919 MALIGNANT NEOPLASM OF FEMALE BREAST, UNSPECIFIED ESTROGEN RECEPTOR STATUS, UNSPECIFIED LATERALITY, UNSPECIFIED SITE OF BREAST (HCC): Primary | ICD-10-CM

## 2021-07-12 NOTE — PROGRESS NOTES
Patients  Juan Manuel called with many concerns about patients behavior - I recommended that the patient see Raven Mcqueen - he stated that he did not think she would come if he did not see her also - Appointment has been made for both patient and  to see Raven on 7/21/2021 at 4:00PM.

## 2021-07-21 ENCOUNTER — OFFICE VISIT (OUTPATIENT)
Dept: PSYCHIATRY | Facility: CLINIC | Age: 46
End: 2021-07-21

## 2021-07-21 DIAGNOSIS — F32.2 CURRENT SEVERE EPISODE OF MAJOR DEPRESSIVE DISORDER WITHOUT PSYCHOTIC FEATURES WITHOUT PRIOR EPISODE (HCC): Primary | ICD-10-CM

## 2021-07-21 DIAGNOSIS — F41.1 GENERALIZED ANXIETY DISORDER: ICD-10-CM

## 2021-07-21 DIAGNOSIS — F41.0 PANIC DISORDER: ICD-10-CM

## 2021-07-21 PROCEDURE — 90792 PSYCH DIAG EVAL W/MED SRVCS: CPT | Performed by: NURSE PRACTITIONER

## 2021-07-21 RX ORDER — VENLAFAXINE HYDROCHLORIDE 37.5 MG/1
37.5 CAPSULE, EXTENDED RELEASE ORAL DAILY
Qty: 7 CAPSULE | Refills: 0 | Status: SHIPPED | OUTPATIENT
Start: 2021-07-21 | End: 2021-08-04

## 2021-07-21 RX ORDER — FLUOXETINE 10 MG/1
CAPSULE ORAL
Qty: 30 CAPSULE | Refills: 0 | Status: SHIPPED | OUTPATIENT
Start: 2021-07-21 | End: 2021-08-04

## 2021-07-21 RX ORDER — ALPRAZOLAM 0.25 MG/1
0.25 TABLET ORAL 2 TIMES DAILY PRN
Qty: 30 TABLET | Refills: 0 | Status: SHIPPED | OUTPATIENT
Start: 2021-07-21 | End: 2021-08-04

## 2021-07-21 NOTE — PROGRESS NOTES
"  Subjective   Belinda Smallwood is a  46 y.o. female who is here today for initial appointment with her . Patient was referred by: Pauline Poe RN, breast cancer navigator for patient's symptoms of depression, anxiety. Patient had bilateral mastectomies without reconstruction and SLN bx in 2019. Placed on Tamoxifen however stopped it for mood destabilization in May 2020. 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 It is a 2 hour travel to [place], [place] they report.     Chief Complaint:  chaotic behaviors, episodes anxious anger irritable depressed        History of Present Illness Patient was diagnosed with breast cancer in 2019 and had bilateral mastectomies without reconstruction by Dr. Constantine Hurst. She was then seen by medical oncologist Dr. Tia Zelaya  placed on Tamoxifen and had significant mood instability. This was stopped in May 2020. She was placed on venlafaxine XR 37.5mg and increased over time to 75mg daily. Patient is having chaotic behaviors and mood instability with crying, agitation, anger, feeling judged, feeling guilt, poor concentration, hyperactivity, but very good sleep at night.The patient reports the following symptoms of generalized anxiety: constant anxiety/worry, racing thoughts,  restlessness/on edge, difficulty concentrating, mind goes blank, irritability and muscle tension. The symptoms have been present for at least 3-6 months but worsening and have caused impairment in important areas of functioning. The patient reports the following panic symptoms: palpitations/pounding heart, trembling, sensation of shortness of breath, chest discomfort, derealization/depersonalization, fear of losing control or \"going crazy\", fear of dying, persistent worry about future panic attacks and avoidance of triggers which have collectively caused impairment in important areas of functioning. Panic symptoms usually last about 60 or more " "minutes at a time. She will feel closed in and needs air.  Panic attacks are reported approximately 5 times per week(s). The patient reports depressive symptoms including depressed mood, crying spells, decreased appetite, anhedonia, feelings of guilt, feelings of hopelessness, feelings of helplessness, feelings of worthlessness, low energy, difficulty concentrating and psychomotor agitation, present on most days and escalating for the past 3 month(s)  and have caused impairment in important areas of functioning. Depression rated 8/10 with 10 being the worst. She denies SI or thoughts of self harm or HI/AVH. She reports her  menstruation stopped on Tamoxifen but \"started my periods after the Tamoxifen was stopped but have heavy bleeding.  She reports she has not seen her GYN in past year. She reports not going to the dentist or really following up with provider f/u except Dr. Fry. Patient's  reports she has gotten very irritable and behaviors such as almost running around and hyperactive during the day. 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 He reports she gets in the car and leaves for the day always coming home never out at night. She went to the emergency department July 9th 2021 with c/o severe headache, with neck pain, arm numbness and difficulty breathing. She was found to have on CT imaging head and chest JEREMIAS, had CT cervical spine findings: C5-C6 degenerative disc disease with posterior disc bulging and osteophyte contributing to mild spinal stenosis. She was given ibuprofen he states and sent home. Denies alcohol, nicotine, illicit drug use. Denies OCD, PTSD, or brad in past. She endorses elevated energy but feels exhausted, moved easily to anger, but also crying.    Stressors: Diagnosed with breast cancer 2019, her mother has had cancer with recurrence and metastatic still alive.  Patient " had bilateral mastectomies without reconstruction and does not want more surgery however a reminder daily when she sees her self about the cancer and fears of recurrence.    20-year-old daughter eloped and moved to [place] for  school.  Patient was very disappointed she was not at her daughter's wedding.  She and her daughter have been very close and worked on the business side of the farm.      Patient since last year has been fully responsible for the business side and has not been able to concentrate and do the work however was unable to tell her  this out of fear of being judged.    Patient endorses fears of cancer recurrence, finding her new normal, self-image concerns, focus concentration and memory issues, depression anxiety panic stressed distressed,    The following portions of the patient's history were reviewed and updated as appropriate: allergies, current medications, past family history, past medical history, past social history, past surgical history and problem list.      Past Psych History: denies history of depression or anxiety except what has been stated above over this past year. She has been on venlafaxine XR 37.5mg then increased to 75mg and has been taking since last summer.     Substance Abuse: Denies all      ABUSE HX: Denies  LEGAL HX: Denies    JOSESITO REVIEWED: No red flags       Family Psychiatric History:  family history includes Breast cancer in her maternal grandmother; Breast cancer (age of onset: 65) in her mother; Lung cancer in her mother; Thyroid disease in her mother.      Social History: Has been  since 1994.  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  They have 3 children a 20-year-old daughter who is  and in veterinary school at [employer].  They also have an 18-year-old and 7-year-old son who help them on the farm.  Both of patient's parents are alive and mother has metastatic breast cancer.      Medical/Surgical History:  Past Medical  History:   Diagnosis Date   • Cancer (CMS/HCC)     Breast   • Eczema    • Lyme disease 2017    treated with abx   • Primary malignant neoplasm of upper outer quadrant of female breast, right (CMS/HCC)      Past Surgical History:   Procedure Laterality Date   • BREAST BIOPSY  07/2010   • BREAST EXCISIONAL BIOPSY Right     1996?   • MASTECTOMY W/ SENTINEL NODE BIOPSY Bilateral 8/14/2019    Procedure: BREAST MASTECTOMY BILATERAL WITH RIGHT SENTINEL NODE BIOPSY;  Surgeon: Constantine Hurst MD;  Location: [facility];  Service: General   • SHOULDER ARTHROSCOPY Right 1992   • US GUIDED CYST ASPIRATION BREAST N/A 7/10/2019   • WISDOM TOOTH EXTRACTION         No Known Allergies    Current Medications:   Current Outpatient Medications   Medication Sig Dispense Refill   • ALPRAZolam (XANAX) 0.25 MG tablet Take 1 tablet by mouth 2 (Two) Times a Day As Needed for Anxiety for up to 15 days. 30 tablet 0   • cetirizine (zyrTEC) 10 MG tablet Take 10 mg by mouth As Needed for Allergies.     • FLUoxetine (PROzac) 10 MG capsule Take one capsule daily 30 capsule 0   • mometasone (ELOCON) 0.1 % cream APPLY TO HANDS 2 TIMES A DAY FOR 3 TO 4 WEEKS, THEN TAKE A BREAK ...  (REFER TO PRESCRIPTION NOTES).  0   • venlafaxine XR (EFFEXOR-XR) 37.5 MG 24 hr capsule Take 1 capsule by mouth Daily. 7 capsule 0   • vitamin D (ERGOCALCIFEROL) 1.25 MG (15085 UT) capsule capsule Take 1 capsule by mouth Every 7 (Seven) Days. 4 capsule 10     No current facility-administered medications for this visit.       Lab Results:  Admission on 07/09/2021, Discharged on 07/10/2021   Component Date Value Ref Range Status   • QT Interval 07/09/2021 354  ms Final   • QTC Interval 07/09/2021 451  ms Final   • QT Interval 07/09/2021 406  ms Final   • QTC Interval 07/09/2021 425  ms Final   • Troponin T 07/09/2021 <0.010  0.000 - 0.030 ng/mL Final   • Troponin T 07/09/2021 <0.010  0.000 - 0.030 ng/mL Final   • Glucose 07/09/2021 98  65 - 99 mg/dL Final   • BUN 07/09/2021 15   6 - 20 mg/dL Final   • Creatinine 07/09/2021 0.66  0.57 - 1.00 mg/dL Final   • Sodium 07/09/2021 139  136 - 145 mmol/L Final   • Potassium 07/09/2021 3.5  3.5 - 5.2 mmol/L Final    Slight hemolysis detected by analyzer. Results may be affected.   • Chloride 07/09/2021 106  98 - 107 mmol/L Final   • CO2 07/09/2021 23.0  22.0 - 29.0 mmol/L Final   • Calcium 07/09/2021 10.0  8.6 - 10.5 mg/dL Final   • Total Protein 07/09/2021 7.5  6.0 - 8.5 g/dL Final   • Albumin 07/09/2021 4.60  3.50 - 5.20 g/dL Final   • ALT (SGPT) 07/09/2021 21  1 - 33 U/L Final   • AST (SGOT) 07/09/2021 31  1 - 32 U/L Final   • Alkaline Phosphatase 07/09/2021 66  39 - 117 U/L Final   • Total Bilirubin 07/09/2021 0.2  0.0 - 1.2 mg/dL Final   • eGFR Non  Amer 07/09/2021 96  >60 mL/min/1.73 Final   • Globulin 07/09/2021 2.9  gm/dL Final   • A/G Ratio 07/09/2021 1.6  g/dL Final   • BUN/Creatinine Ratio 07/09/2021 22.7  7.0 - 25.0 Final   • Anion Gap 07/09/2021 10.0  5.0 - 15.0 mmol/L Final   • Lipase 07/09/2021 40  13 - 60 U/L Final   • proBNP 07/09/2021 36.6  0.0 - 450.0 pg/mL Final   • HCG, Urine, QL 07/09/2021 Negative  Negative Final   • Lot Number 07/09/2021 [id number]   Final   • Internal Positive Control 07/09/2021 Positive  Positive Final   • Internal Negative Control 07/09/2021 Negative  Negative Final   • Extra Tube 07/09/2021 Hold for add-ons.   Final    Auto resulted.   • Extra Tube 07/09/2021 hold for add-on   Final    Auto resulted   • Extra Tube 07/09/2021 Hold for add-ons.   Final    Auto resulted.   • Extra Tube 07/09/2021 Hold for add-ons.   Final    Auto resulted.   • WBC 07/09/2021 7.77  3.40 - 10.80 10*3/mm3 Final   • RBC 07/09/2021 4.53  3.77 - 5.28 10*6/mm3 Final   • Hemoglobin 07/09/2021 11.5* 12.0 - 15.9 g/dL Final   • Hematocrit 07/09/2021 36.1  34.0 - 46.6 % Final   • MCV 07/09/2021 79.7  79.0 - 97.0 fL Final   • MCH 07/09/2021 25.4* 26.6 - 33.0 pg Final   • MCHC 07/09/2021 31.9  31.5 - 35.7 g/dL Final   • RDW  07/09/2021 15.0  12.3 - 15.4 % Final   • RDW-SD 07/09/2021 43.8  37.0 - 54.0 fl Final   • MPV 07/09/2021 9.6  6.0 - 12.0 fL Final   • Platelets 07/09/2021 248  140 - 450 10*3/mm3 Final   • Neutrophil % 07/09/2021 63.3  42.7 - 76.0 % Final   • Lymphocyte % 07/09/2021 27.4  19.6 - 45.3 % Final   • Monocyte % 07/09/2021 8.4  5.0 - 12.0 % Final   • Eosinophil % 07/09/2021 0.3  0.3 - 6.2 % Final   • Basophil % 07/09/2021 0.5  0.0 - 1.5 % Final   • Immature Grans % 07/09/2021 0.1  0.0 - 0.5 % Final   • Neutrophils, Absolute 07/09/2021 4.92  1.70 - 7.00 10*3/mm3 Final   • Lymphocytes, Absolute 07/09/2021 2.13  0.70 - 3.10 10*3/mm3 Final   • Monocytes, Absolute 07/09/2021 0.65  0.10 - 0.90 10*3/mm3 Final   • Eosinophils, Absolute 07/09/2021 0.02  0.00 - 0.40 10*3/mm3 Final   • Basophils, Absolute 07/09/2021 0.04  0.00 - 0.20 10*3/mm3 Final   • Immature Grans, Absolute 07/09/2021 0.01  0.00 - 0.05 10*3/mm3 Final   • nRBC 07/09/2021 0.0  0.0 - 0.2 /100 WBC Final   • Color, UA 07/09/2021 Yellow  Yellow, Straw Final   • Appearance, UA 07/09/2021 Cloudy* Clear Final   • pH, UA 07/09/2021 7.0  5.0 - 8.0 Final   • Specific Gravity, UA 07/09/2021 >=1.030  1.001 - 1.030 Final   • Glucose, UA 07/09/2021 Negative  Negative Final   • Ketones, UA 07/09/2021 Trace* Negative Final   • Bilirubin, UA 07/09/2021 Negative  Negative Final   • Blood, UA 07/09/2021 Negative  Negative Final   • Protein, UA 07/09/2021 Negative  Negative Final   • Leuk Esterase, UA 07/09/2021 Trace* Negative Final   • Nitrite, UA 07/09/2021 Negative  Negative Final   • Urobilinogen, UA 07/09/2021 1.0 E.U./dL  0.2 - 1.0 E.U./dL Final   • RBC, UA 07/09/2021 3-6* None Seen, 0-2 /HPF Final   • WBC, UA 07/09/2021 6-12* None Seen, 0-2 /HPF Final   • Bacteria, UA 07/09/2021 2+* None Seen, Trace /HPF Final   • Squamous Epithelial Cells, UA 07/09/2021 7-12* None Seen, 0-2 /HPF Final   • Hyaline Casts, UA 07/09/2021 0-6  0 - 6 /LPF Final   • Methodology 07/09/2021 Automated  Microscopy   Final         Review of Systems Constitutional: Negative for appetite change, chills, diaphoresis, fatigue, fever and unexpected weight change.   HENT: Negative for hearing loss, sore throat, trouble swallowing and voice change.    Eyes: Negative for photophobia and visual disturbance.   Respiratory: Negative for cough, chest tightness and shortness of breath.    Cardiovascular: Negative for chest pain and palpitations.   Gastrointestinal: Negative for abdominal pain, constipation, nausea and vomiting.   Endocrine: Negative for cold intolerance and heat intolerance.   Genitourinary: Negative for dysuria and frequency.   Musculoskeletal: Negative for arthralgia, back pain, joint swelling and neck stiffness.   Skin: Negative for color change and wound.   Allergic/Immunologic: Negative for environmental allergies and immunocompromised state.   Neurological: Negative for dizziness, tremors, seizures, syncope, weakness, light-headedness and headaches.   Hematological: Negative for adenopathy. Does not bruise/bleed easily.    Objective   Physical Exam  not currently breastfeeding.    IAN-7:    Over the last two weeks, how often have you been bothered by the following problems?  Feeling nervous, anxious or on edge: Nearly every day  Not being able to stop or control worrying: Nearly every day  Worrying too much about different things: Nearly every day  Trouble Relaxing: Nearly every day  Being so restless that it is hard to sit still: Nearly every day  Becoming easily annoyed or irritable: Nearly every day  Feeling afraid as if something awful might happen: Nearly every day  IAN 7 Total Score: 21  If you checked any problems, how difficult have these problems made it for you to do your work, take care of things at home, or get along with other people: Extremely difficult  0-4: Minimal anxiety  5-9: Mild anxiety  10-14: Moderate anxiety  15-21: Severe anxiety    PHQ-9:  PHQ-2/PHQ-9 Depression Screening  7/21/2021   Little interest or pleasure in doing things 3   Feeling down, depressed, or hopeless 3   Trouble falling or staying asleep, or sleeping too much 0   Feeling tired or having little energy 3   Poor appetite or overeating 2   Feeling bad about yourself - or that you are a failure or have let yourself or your family down 3   Trouble concentrating on things, such as reading the newspaper or watching television 3   Moving or speaking so slowly that other people could have noticed. Or the opposite - being so fidgety or restless that you have been moving around a lot more than usual 3   Thoughts that you would be better off dead, or of hurting yourself in some way 0   Total Score 20   If you checked off any problems, how difficult have these problems made it for you to do your work, take care of things at home, or get along with other people? Extremely dIfficult      5-9: Minimal symptoms  10-14: Major depression mild  15-19: Major depression moderate  Greater then 20: Major depression severe    Mental Status Exam:   Appearance: appropriate  Hygiene:   good  Cooperation:  Cooperative  Eye Contact:  Downcast  Psychomotor Behavior:  Appropriate  Mood:  anxious and depressed  Affect:  congruent, crying   Hopelessness: 5  Speech:  Normal  Thought Process:  Linear  Thought Content:  Normal  Suicidal:  None  Homicidal:  None  Hallucinations:  None  Delusion:  None  Memory:  Intact  Orientation:  Person, Place, Time and Situation  Reliability:  fair  Insight:  Poor  Judgement:  Fair  Impulse Control:  Fair        Short-term goals: Patient will be compliant with clinic appointments.  Patient will be engaged in therapy, medication compliant with minimal side effects. Patient  will report decrease of symptoms and frequency.    Long-term goals: Patient will have minimal symptoms of mental health disorder with continued treatment. Patient will be compliant with treatment and appointments.       Problem list: severe anxiety  and depression   Strengths: patient appears motivated for treatment          Assessment/Plan   Diagnoses and all orders for this visit:    1. Current severe episode of major depressive disorder without psychotic features without prior episode (CMS/HCC) (Primary)    2. Panic disorder  -     ALPRAZolam (XANAX) 0.25 MG tablet; Take 1 tablet by mouth 2 (Two) Times a Day As Needed for Anxiety for up to 15 days.  Dispense: 30 tablet; Refill: 0    3. Generalized anxiety disorder    Other orders  -     venlafaxine XR (EFFEXOR-XR) 37.5 MG 24 hr capsule; Take 1 capsule by mouth Daily.  Dispense: 7 capsule; Refill: 0  -     FLUoxetine (PROzac) 10 MG capsule; Take one capsule daily  Dispense: 30 capsule; Refill: 0        A psychological evaluation was conducted in order to assess past and current level of functioning. Areas assessed included, but were not limited to: perception of social support, perception of ability to face and deal with challenges in life (positive functioning), anxiety symptoms, depressive symptoms, perspective on beliefs/belief system, coping skills for stress, intelligence level,  Therapeutic rapport was established. Interventions conducted today were geared towards incorporating medication management along with support for continued therapy. Education was also provided as to the med management with this provider and what to expect in subsequent sessions.    Assisted patient in processing above session content; acknowledged and normalized patient’s thoughts, feelings, and concerns.  Applied  positive coping skills and behavior management in session.  Allowed patient to freely discuss issues without interruption or judgment. Provided safe, confidential environment to facilitate the development of positive therapeutic relationship and encourage open, honest communication. Assisted patient in identifying risk factors which would indicate the need for higher level of care including thoughts to harm self or  others and/or self-harming behavior and encouraged patient to contact this office, call 911, or present to the nearest emergency room should any of these events occur. Discussed crisis intervention services and means to access.  Patient adamantly and convincingly denies current suicidal or homicidal ideation or perceptual disturbance.    Discussed diagnosis and recommendations for treatment:    PROVIDE: Cognitive Behavioral Therapy and Solution Focused Therapy to improve functioning, maintain stability, and avoid decompensation and the need for higher level of care.    MEDICATION MANAGEMENT RECOMMENDATIONS: wean pt off venlafaxine XR, will use fluoxetine to help with this 10 mg daily and reduction of venlafaxine to 37.5 x 7 days then stop, but cont fluoxetine instructions written out for pt and .  Begin alprazolam 0.25mg po BID as needed for severe anxiety/panic agitation (short term plan to stabilize pt mood)    May need antipsychotic such as Abilify/  with fluoxetine for major anxiety     We discussed risks, benefits,goals and side effects of the above medication and the patient was agreeable with the plan.Patient was educated on the importance of compliance with treatment and follow-up appointments.To call for questions or concerns and return early if necessary. Crisis plan reviewed including going to the Emergency department.       Treatment Plan: stabilize mood,  patient will stay out of the hospital and be at optimal level of functioning, take all medication as prescribed. Patient verbalized  understanding and agreement to plan.      Return in about 6 days (around 7/27/2021). [facility] telemed evaluation f/u.

## 2021-07-22 ENCOUNTER — TELEMEDICINE (OUTPATIENT)
Dept: ONCOLOGY | Facility: CLINIC | Age: 46
End: 2021-07-22

## 2021-07-22 DIAGNOSIS — C50.919 MALIGNANT NEOPLASM OF FEMALE BREAST, UNSPECIFIED ESTROGEN RECEPTOR STATUS, UNSPECIFIED LATERALITY, UNSPECIFIED SITE OF BREAST (HCC): Primary | ICD-10-CM

## 2021-07-22 DIAGNOSIS — C50.411 PRIMARY MALIGNANT NEOPLASM OF UPPER OUTER QUADRANT OF FEMALE BREAST, RIGHT (HCC): ICD-10-CM

## 2021-07-22 PROCEDURE — 99213 OFFICE O/P EST LOW 20 MIN: CPT | Performed by: INTERNAL MEDICINE

## 2021-07-22 NOTE — PROGRESS NOTES
"PROBLEM LIST:  Oncology/Hematology History   Primary malignant neoplasm of upper outer quadrant of female breast, right (CMS/HCC)   7/10/2019 Biopsy    Right breast     8/14/2019 Initial Diagnosis    Primary malignant neoplasm of upper outer quadrant of female breast, right (CMS/HCC)     8/14/2019 Surgery    Surgery       Procedure:  Bilateral mastectomy      Location:  Both breasts      Completeness of resection:  No evidence of residual tumor       10/21/2019 Cancer Staged    Cancer Staging  Primary malignant neoplasm of upper outer quadrant of female breast, right (CMS/HCC)  Staging form: Breast, AJCC 8th Edition  - Pathologic stage from 8/14/2019: Stage IA (pT1c, pN0, cM0, G2, ER: Positive, AL: Positive, HER2: Negative) - Signed by Tia Zelaya MD on 9/30/2019    Low risk Oncotype - 7     10/21/2019 -  Hormonal Therapy    Tamoxifen        TELEMEDICINE FOLLOW-UP     In order to limit face-to-face contact and enact \"social distancing\" in light of the COVID-19 outbreaks and in accordance to the recommendations per the CDC, WHO, and our individual department, Belinda Smallwood  was contacted.  Telemedicine was used to screen the patient for needs and conduct the patient's regularly scheduled follow-up.     COVID-19 screening: female specifically denies fever, body aches, cough, difficulty breathing, sore throat, runny nose, recent travel, or known sick exposures.      Belinda Smallwood was consented to undergo video televisit and was agreeable.    REASON FOR VISIT: Management of my breast cancer    HISTORY OF PRESENT ILLNESS:   46 y.o.  female presents today for follow-up of her breast cancer.  She could not tolerate tamoxifen.  It was causing a fair bit of mood changes and headaches.  She stopped it in May 2020.  Her biggest issue is considerable depression.  For which she is being followed by Dr. Mcqueen.  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    Past medical history, social history and " family history was reviewed and unchanged from prior visit.    Review of Systems:    Review of Systems   Constitutional: Negative.    HENT:  Negative.    Eyes: Negative.    Respiratory: Negative.    Cardiovascular: Negative.    Gastrointestinal: Negative.    Endocrine: Positive for hot flashes.   Genitourinary: Negative.     Musculoskeletal: Negative.    Skin: Negative.    Neurological: Negative.    Hematological: Negative.    Psychiatric/Behavioral: Positive for depression. The patient is nervous/anxious.         Labile moods      A comprehensive 14 point review of systems was performed and was negative except as mentioned.      Medications:        Current Outpatient Medications:   •  ALPRAZolam (XANAX) 0.25 MG tablet, Take 1 tablet by mouth 2 (Two) Times a Day As Needed for Anxiety for up to 15 days., Disp: 30 tablet, Rfl: 0  •  cetirizine (zyrTEC) 10 MG tablet, Take 10 mg by mouth As Needed for Allergies., Disp: , Rfl:   •  FLUoxetine (PROzac) 10 MG capsule, Take one capsule daily, Disp: 30 capsule, Rfl: 0  •  mometasone (ELOCON) 0.1 % cream, APPLY TO HANDS 2 TIMES A DAY FOR 3 TO 4 WEEKS, THEN TAKE A BREAK ...  (REFER TO PRESCRIPTION NOTES)., Disp: , Rfl: 0  •  venlafaxine XR (EFFEXOR-XR) 37.5 MG 24 hr capsule, Take 1 capsule by mouth Daily., Disp: 7 capsule, Rfl: 0  •  vitamin D (ERGOCALCIFEROL) 1.25 MG (08517 UT) capsule capsule, Take 1 capsule by mouth Every 7 (Seven) Days., Disp: 4 capsule, Rfl: 10      ALLERGIES:  No Known Allergies      Physical Exam    VITAL SIGNS:  There were no vitals taken for this visit.    Wt Readings from Last 3 Encounters:   07/09/21 65.8 kg (145 lb)   07/22/20 62.1 kg (137 lb)   01/22/20 61.7 kg (136 lb)       There is no height or weight on file to calculate BMI. There is no height or weight on file to calculate BSA.         Performance Status: 0      RECENT LABS:    Lab Results   Component Value Date    HGB 11.5 (L) 07/09/2021    HCT 36.1 07/09/2021    MCV 79.7 07/09/2021    PLT  248 07/09/2021    WBC 7.77 07/09/2021    NEUTROABS 4.92 07/09/2021    LYMPHSABS 2.13 07/09/2021    MONOSABS 0.65 07/09/2021    EOSABS 0.02 07/09/2021    BASOSABS 0.04 07/09/2021       Mammo Post Clip Placement Right    Addendum Date: 7/30/2019    PATHOLOGY: Invasive ductal carcinoma, intermediate grade. Associated ductal carcinoma in situ, intermediate grade, cribriform pattern.  The pathology results are felt to be concordant with the imaging findings.  RECOMMENDATION: Recommend surgical consultation as well as preoperative breast MRI.  The patient will be notified of the results/recommendations by our breast imaging nurse.  This report was finalized on 7/30/2019 9:00 AM by Dr. Tonya Denson MD.      Us Guided Cyst Aspiration Breast    Addendum Date: 7/30/2019    CYTOLOGY: [facility].  The cytology results are felt to be concordant with the imaging findings.  RECOMMENDATION:  Recommend excisional biopsy.  The patient was notified of the results/recommendations by her breast imaging staff.  This report was finalized on 7/30/2019 9:00 AM by Dr. Tonya Denson MD.      Result Date: 7/30/2019  Fine-needle aspiration biopsy of a 0.4 cm probable complicated cyst 3:00 right breast. Cytology results are pending.  This report was finalized on 7/11/2019 8:55 AM by Dr. Tonya Denson MD.      Nm [place] Node Injection Only    Result Date: 8/15/2019  Pharmaceutical for injection into the right breast for sentinel mapping and lymphoscintigraphy for right breast carcinoma.  D:  08/15/2019 E:  08/15/2019     This report was finalized on 8/15/2019 2:10 PM by Dr. Radha Briones MD.      Mammo Diagnostic Digital Tomosynthesis Right With Cad    Result Date: 7/11/2019  There is a persistent irregular isodense mass in the posterior right 3:00 position. Sonographically this correlates to an irregular appearing isointense mass as described above measuring 0.7 cm in size. Mammographically there is an adjacent smaller mass. This is seen  sonographically as well and measures 0.4 cm on this modality. This smaller mass may reflect a small complicated cyst. The larger mass is concerning for small invasive carcinoma. Further evaluation with ultrasound-guided biopsy is recommended and will be performed today.  ACR BI-RADS CATEGORY:  5, HIGHLY SUGGESTIVE OF MALIGNANCY  RECOMMENDATION:  Recommend ultrasound-guided core biopsy of a 0.7 cm mass located in the 3 o'clock position of the right breast as described above as well as ultrasound guided aspiration of a possible 0.4 cm cyst also located at 3 o'clock as described above.  CAD was utilized.  The standard false-negative rate of mammography is between 10% and 25%. Complex patterns or increased breast density will markedly elevate the false-negative rate of mammography.    A letter, in lay terminology, with the results of this exam was given to the patient at the time of the visit.  __________________________________________________________ Physician Order  Ultrasound Guided Breast Biopsy  Diagnosis: Abnormal Mammogram  This report was finalized on 7/11/2019 9:02 AM by Dr. Tonya Denson MD.      Us Breast Right Limited    Result Date: 7/11/2019  There is a persistent irregular isodense mass in the posterior right 3:00 position. Sonographically this correlates to an irregular appearing isointense mass as described above measuring 0.7 cm in size. Mammographically there is an adjacent smaller mass. This is seen sonographically as well and measures 0.4 cm on this modality. This smaller mass may reflect a small complicated cyst. The larger mass is concerning for small invasive carcinoma. Further evaluation with ultrasound-guided biopsy is recommended and will be performed today.  ACR BI-RADS CATEGORY:  5, HIGHLY SUGGESTIVE OF MALIGNANCY  RECOMMENDATION:  Recommend ultrasound-guided core biopsy of a 0.7 cm mass located in the 3 o'clock position of the right breast as described above as well as ultrasound guided  aspiration of a possible 0.4 cm cyst also located at 3 o'clock as described above.  CAD was utilized.  The standard false-negative rate of mammography is between 10% and 25%. Complex patterns or increased breast density will markedly elevate the false-negative rate of mammography.    A letter, in lay terminology, with the results of this exam was given to the patient at the time of the visit.  __________________________________________________________ Physician Order  Ultrasound Guided Breast Biopsy  Diagnosis: Abnormal Mammogram  This report was finalized on 7/11/2019 9:02 AM by Dr. Tonya Denson MD.      Mammo Screening Digital Tomosynthesis Bilateral With Cad    Result Date: 7/3/2019  Findings right breast for which additional views are recommended.  ACR BI-RADS CATEGORY:  0, INCOMPLETE:   NEED ADDITIONAL IMAGING EVALUATION  RECOMMENDATION: Recommend right CC and MLO focal compression views as well as a right ML view. Also, the patient is a candidate for annual high-risk screening breast MRI based on the Cindi risk assessment model performed at time of screening.  CAD was utilized.  The standard false-negative rate of mammography is between 10% and 25%. Complex patterns or increased breast density will markedly elevate the false-negative rate of mammography.    A letter, in lay terminology, with the results of this exam will be mailed to the patient.   The patient will be contacted by our office to schedule for the additional imaging evaluation.  Please accept this as sufficient order for the additional imaging evaluation.  ________________________________________________________________________ _______ Physicians Order  Diagnostic Mammogram with Breast Ultrasound if needed  Diagnosis: Abnormal Mammogram  This report was finalized on 7/3/2019 8:34 AM by Dr. Tonya Denson MD.      Us Guided Breast Biopsy With & Without Device Initial    Addendum Date: 7/30/2019    PATHOLOGY: Invasive ductal carcinoma, intermediate  grade. Associated ductal carcinoma in situ, intermediate grade, cribriform pattern.  The pathology results are felt to be concordant with the imaging findings.  RECOMMENDATION: Recommend surgical consultation as well as preoperative breast MRI.  The patient will be notified of the results/recommendations by our breast imaging nurse.  This report was finalized on 7/30/2019 9:00 AM by Dr. Tonya Denson MD.            Assessment/Plan    1.  Stage I ER [place] positive HER-2 negative breast cancer in a premenopausal woman with low risk oncotype.  No clinical sign of recurrence at this point.  Continue observation only.  She is not on any adjuvant therapy due to side effects.    2.  Severe depression.  She follows with Dr. Mcqueen for this.    I spent a total of 15 minutes in direct patient care spent in coordination of care, and counseling the patient regarding  Breast Cancer . Answered any questions patient had with medication and plan.      Tia Zelaya MD  [facility] Hematology and Oncology    Return in (Approximately): 1 year    No orders of the defined types were placed in this encounter.      7/22/2021

## 2021-07-27 ENCOUNTER — TELEPHONE (OUTPATIENT)
Dept: PSYCHIATRY | Facility: CLINIC | Age: 46
End: 2021-07-27

## 2021-07-27 NOTE — TELEPHONE ENCOUNTER
Spoke with patient's . Yesterday afternoon patient took the remainder of two medication bottles and threw at .  He took her immediately to the [facility] ED and was admitted. She is stable and kept in ICU for psych evaluation. Recommendation for pt to be admitted to psychiatric hospital for stabilization, he will be talking to [facility]. Asked  to keep this provider informed. He agrees.

## 2021-07-27 NOTE — TELEPHONE ENCOUNTER
----- Message from Holly Marquez sent at 7/27/2021  8:35 AM EDT -----  Regarding: RE: PATIENT CARE  Good morning Raven,   I have received a call from this patients  this morning stating that patient attempted suicide last night and is currently in the ICU at [facility]. He stated that patient will have a group from [facility] come to evaluate her care this evening as she's been held for 24 hours there in the ICU. Patient  would like a call back as soon as possible because he would like for you to be involved in patient care. His call back # is [phone]. Please advise.   Thanks,   Holly RESENDEZ

## 2021-08-04 ENCOUNTER — OFFICE VISIT (OUTPATIENT)
Dept: OBSTETRICS AND GYNECOLOGY | Facility: CLINIC | Age: 46
End: 2021-08-04

## 2021-08-04 VITALS
WEIGHT: 138 LBS | HEIGHT: 67 IN | DIASTOLIC BLOOD PRESSURE: 62 MMHG | BODY MASS INDEX: 21.66 KG/M2 | SYSTOLIC BLOOD PRESSURE: 98 MMHG

## 2021-08-04 DIAGNOSIS — Z12.11 SCREENING FOR COLON CANCER: ICD-10-CM

## 2021-08-04 DIAGNOSIS — Z01.419 WOMEN'S ANNUAL ROUTINE GYNECOLOGICAL EXAMINATION: Primary | ICD-10-CM

## 2021-08-04 DIAGNOSIS — Z85.3 PERSONAL HISTORY OF BREAST CANCER: ICD-10-CM

## 2021-08-04 PROCEDURE — 99396 PREV VISIT EST AGE 40-64: CPT | Performed by: NURSE PRACTITIONER

## 2021-08-04 NOTE — PROGRESS NOTES
GYN Annual Exam     CC - Here for annual exam.        HPI  Belinda Smallwood is a 46 y.o. female, , who presents for annual well woman exam. Patient's last menstrual period was 2021..  Periods are regular every 25-35 days she states there have been a few times where she has skipped a period, lasting 3- 5 days. .  Dysmenorrhea:moderate, occurring first 1-2 days of flow.  Patient reports problems with: none. There were no changes to her medical or surgical history since her last visit.. Partner Status: Marital Status: .  New Partners since last visit: no.  Desires STD Screening: no.    Requests annual blood work, including thyroid studies. She does not have a PCP.     Personal h/o breast cancer. Had double mastectomy.     Additional OB/GYN History   Current contraception: contraceptive methods: None  Desires to: do not start contraception  Last Pap : 2014- negative  Last Completed Pap Smear     This patient has no relevant Health Maintenance data.        History of abnormal Pap smear: yes   Family history of uterine, colon, breast, or ovarian cancer: yes - mother and maternal grandmother with breast cancer.   Performs monthly Self-Breast Exam: no  Last mammogram: 2019  Last Completed Mammogram     This patient has no relevant Health Maintenance data.         Exercises Regularly: no  Feelings of Anxiety or Depression: yes - anxiety  Tobacco Usage?: No   OB History        3    Para   3    Term   3            AB        Living           SAB        TAB        Ectopic        Molar        Multiple        Live Births                    Health Maintenance   Topic Date Due   • Annual Gynecologic Pelvic and Breast Exam  Never done   • COLORECTAL CANCER SCREENING  Never done   • ANNUAL PHYSICAL  Never done   • TDAP/TD VACCINES (1 - Tdap) Never done   • HEPATITIS C SCREENING  Never done   • PAP SMEAR  2019   • INFLUENZA VACCINE  10/01/2021   • COVID-19 Vaccine  Completed   • Pneumococcal  "Vaccine 0-64  Aged Out       The additional following portions of the patient's history were reviewed and updated as appropriate: allergies, current medications, past family history, past medical history, past social history, past surgical history and problem list.    Review of Systems   Constitutional: Negative.    HENT: Negative.    Eyes: Negative.    Respiratory: Negative.    Cardiovascular: Negative.    Gastrointestinal: Negative.    Endocrine: Negative.    Genitourinary: Negative.    Musculoskeletal: Negative.    Skin: Negative.    Allergic/Immunologic: Negative.    Neurological: Negative.    Hematological: Negative.    Psychiatric/Behavioral: Negative.          I have reviewed and agree with the HPI, ROS, and historical information as entered above. Nasreen Luong, APRN    Objective   BP 98/62   Ht 170.2 cm (67.01\")   Wt 62.6 kg (138 lb)   LMP 07/14/2021   BMI 21.61 kg/m²     Physical Exam  Vitals and nursing note reviewed. Exam conducted with a chaperone present.   Constitutional:       Appearance: She is well-developed.   HENT:      Head: Normocephalic and atraumatic.   Neck:      Thyroid: No thyroid mass or thyromegaly.   Cardiovascular:      Rate and Rhythm: Normal rate and regular rhythm.      Heart sounds: No murmur heard.     Pulmonary:      Effort: Pulmonary effort is normal. No retractions.      Breath sounds: Normal breath sounds. No wheezing, rhonchi or rales.   Chest:      Chest wall: No mass or tenderness.      Breasts:         Right: Absent.         Left: Absent.      Comments: Bart mastectomy  Abdominal:      General: Bowel sounds are normal.      Palpations: Abdomen is soft. Abdomen is not rigid. There is no mass.      Tenderness: There is no abdominal tenderness. There is no guarding.      Hernia: No hernia is present. There is no hernia in the left inguinal area.   Genitourinary:     Labia:         Right: No rash, tenderness or lesion.         Left: No rash, tenderness or lesion.       " Vagina: Normal. No vaginal discharge or lesions.      Cervix: No cervical motion tenderness, discharge, lesion or cervical bleeding.      Uterus: Normal. Not enlarged, not fixed and not tender.       Adnexa:         Right: No mass or tenderness.          Left: No mass or tenderness.        Rectum: No external hemorrhoid.   Musculoskeletal:      Cervical back: Normal range of motion. No muscular tenderness.   Neurological:      Mental Status: She is alert and oriented to person, place, and time.   Psychiatric:         Behavior: Behavior normal.            Assessment and Plan    Problem List Items Addressed This Visit     None      Visit Diagnoses     Women's annual routine gynecological examination    -  Primary    Relevant Orders    Pap IG, HPV-hr    CBC (No Diff)    Hemoglobin A1c    Lipid Panel    TSH    Vitamin D 25 Hydroxy    Comprehensive Metabolic Panel    T4, Free    T3    Personal history of breast cancer        Screening for colon cancer        Relevant Orders    Ambulatory Referral For Screening Colonoscopy          1. GYN annual well woman exam.   2. Reviewed monthly self breast exams.  Instructed to call with lumps, pain, or breast discharge.    3. Recommended use of Vitamin D replacement and getting adequate calcium in her diet. (1500mg)  4. Reviewed exercise as a preventative health measures.   5. Reviewed [facility] ovarian cancer screening program.  Information given for appointment scheduling.   6. Symptoms of menopausal transition reviewed with patient.   7. RTC in 1 year or PRN with problems.  8. Other: Screening colonoscopy ordered.     NATHANAEL Zamora  08/04/2021

## 2021-08-05 LAB
25(OH)D3+25(OH)D2 SERPL-MCNC: 31 NG/ML (ref 30–100)
ALBUMIN SERPL-MCNC: 4.5 G/DL (ref 3.5–5.2)
ALBUMIN/GLOB SERPL: 1.7 G/DL
ALP SERPL-CCNC: 60 U/L (ref 39–117)
ALT SERPL-CCNC: 14 U/L (ref 1–33)
AST SERPL-CCNC: 23 U/L (ref 1–32)
BILIRUB SERPL-MCNC: <0.2 MG/DL (ref 0–1.2)
BUN SERPL-MCNC: 10 MG/DL (ref 6–20)
BUN/CREAT SERPL: 17.2 (ref 7–25)
CALCIUM SERPL-MCNC: 9.6 MG/DL (ref 8.6–10.5)
CHLORIDE SERPL-SCNC: 102 MMOL/L (ref 98–107)
CHOLEST SERPL-MCNC: 234 MG/DL (ref 0–200)
CO2 SERPL-SCNC: 27.4 MMOL/L (ref 22–29)
CREAT SERPL-MCNC: 0.58 MG/DL (ref 0.57–1)
ERYTHROCYTE [DISTWIDTH] IN BLOOD BY AUTOMATED COUNT: 14.6 % (ref 12.3–15.4)
GLOBULIN SER CALC-MCNC: 2.7 GM/DL
GLUCOSE SERPL-MCNC: 79 MG/DL (ref 65–99)
HBA1C MFR BLD: 5.2 % (ref 4.8–5.6)
HCT VFR BLD AUTO: 33.9 % (ref 34–46.6)
HDLC SERPL-MCNC: 71 MG/DL (ref 40–60)
HGB BLD-MCNC: 10.9 G/DL (ref 12–15.9)
LDLC SERPL CALC-MCNC: 143 MG/DL (ref 0–100)
MCH RBC QN AUTO: 25.5 PG (ref 26.6–33)
MCHC RBC AUTO-ENTMCNC: 32.2 G/DL (ref 31.5–35.7)
MCV RBC AUTO: 79.4 FL (ref 79–97)
PLATELET # BLD AUTO: 261 10*3/MM3 (ref 140–450)
POTASSIUM SERPL-SCNC: 4.5 MMOL/L (ref 3.5–5.2)
PROT SERPL-MCNC: 7.2 G/DL (ref 6–8.5)
RBC # BLD AUTO: 4.27 10*6/MM3 (ref 3.77–5.28)
SODIUM SERPL-SCNC: 139 MMOL/L (ref 136–145)
T3 SERPL-MCNC: 116 NG/DL (ref 71–180)
T4 FREE SERPL-MCNC: 0.94 NG/DL (ref 0.93–1.7)
TRIGL SERPL-MCNC: 115 MG/DL (ref 0–150)
TSH SERPL DL<=0.005 MIU/L-ACNC: 5.96 UIU/ML (ref 0.27–4.2)
VLDLC SERPL CALC-MCNC: 20 MG/DL (ref 5–40)
WBC # BLD AUTO: 6.16 10*3/MM3 (ref 3.4–10.8)

## 2021-09-13 RX ORDER — VENLAFAXINE HYDROCHLORIDE 75 MG/1
75 CAPSULE, EXTENDED RELEASE ORAL DAILY
Qty: 90 CAPSULE | OUTPATIENT
Start: 2021-09-13

## 2022-05-11 LAB
CYTO UR: NORMAL
LAB AP CASE REPORT: NORMAL
LAB AP CLINICAL INFORMATION: NORMAL
LAB AP DIAGNOSIS COMMENT: NORMAL
LAB AP IHC ER/PR AND HER2/NEU REPORT,ADDENDUM: NORMAL
LAB AP OUTSIDE REPORT, ADDENDUM: NORMAL
PATH REPORT.FINAL DX SPEC: NORMAL
PATH REPORT.GROSS SPEC: NORMAL

## 2022-08-10 ENCOUNTER — OFFICE VISIT (OUTPATIENT)
Dept: OBSTETRICS AND GYNECOLOGY | Facility: CLINIC | Age: 47
End: 2022-08-10

## 2022-08-10 VITALS
DIASTOLIC BLOOD PRESSURE: 64 MMHG | SYSTOLIC BLOOD PRESSURE: 112 MMHG | BODY MASS INDEX: 20.97 KG/M2 | HEIGHT: 67 IN | WEIGHT: 133.6 LBS

## 2022-08-10 DIAGNOSIS — Z01.419 ROUTINE GYNECOLOGICAL EXAMINATION: Primary | ICD-10-CM

## 2022-08-10 DIAGNOSIS — Z85.3 PERSONAL HISTORY OF BREAST CANCER: ICD-10-CM

## 2022-08-10 PROCEDURE — 99396 PREV VISIT EST AGE 40-64: CPT | Performed by: NURSE PRACTITIONER

## 2022-08-10 NOTE — PROGRESS NOTES
Gynecologic Annual Exam Note          GYN Annual Exam     Gynecologic Exam        Subjective     HPI  Belinda Smallwood is a 47 y.o. female, , who presents for annual well woman exam as a established patient . Patient's last menstrual period was 2022 (approximate)..  Periods are irregular, happening every 2 months, lasting 5-7 days, with mild dysmenorrhea, throughout the period. Patient reports problems with: none.  Partner Status: Marital Status: . She is is sexually active. She has not had new partners.. STD testing recommendations have been explained to the patient and she does not desire STD testing. There were no changes to her medical or surgical history since her last visit.. She has a personal history of breast cancer. Diagnosed in 2019. She did not have to do chemo or radiation but was on tamoxifen for over a year and had terrible side effects from it.       Additional OB/GYN History   Current contraception: contraceptive methods: Condoms  Desires to: continue contraception    Last Pap : 2021 . Result: negative. HPV: negative  Last Completed Pap Smear          Ordered - PAP SMEAR (Every 3 Years) Ordered on 8/10/2022    2021  SCANNED - PAP SMEAR    2014  Done - negative              History of abnormal Pap smear: yes - 20 + years ago   Family history of uterine, colon, breast, or ovarian cancer: yes - Mother and MGM- Breast   Performs monthly Self-Breast Exam: yes  Last mammogram: 07/10/2019. Done at .    Last Completed Mammogram     This patient has no relevant Health Maintenance data.          History of abnormal mammogram: yes - breast cancer 2019    Colonoscopy: has had a colonoscopy 6 year(s) ago.  Exercises Regularly: yes  Feelings of Anxiety or Depression: yes - both   Tobacco Usage?: No     No current outpatient medications on file.     Patient denies the need for medication refills today.    OB History        3    Para   3    Term   3      "       AB        Living   3       SAB        IAB        Ectopic        Molar        Multiple        Live Births   3                Past Medical History:   Diagnosis Date   • Cancer (HCC)     Breast   • Eczema    • Lyme disease 2017    treated with abx   • Primary malignant neoplasm of upper outer quadrant of female breast, right (HCC)         Past Surgical History:   Procedure Laterality Date   • BREAST BIOPSY  07/2010   • BREAST EXCISIONAL BIOPSY Right     1996?   • MASTECTOMY W/ SENTINEL NODE BIOPSY Bilateral 8/14/2019    Procedure: BREAST MASTECTOMY BILATERAL WITH RIGHT SENTINEL NODE BIOPSY;  Surgeon: Constantine Hurst MD;  Location: [facility];  Service: General   • SHOULDER ARTHROSCOPY Right 1992   • US GUIDED CYST ASPIRATION BREAST N/A 7/10/2019   • WISDOM TOOTH EXTRACTION         Health Maintenance   Topic Date Due   • DXA SCAN  Never done   • ANNUAL PHYSICAL  Never done   • TDAP/TD VACCINES (1 - Tdap) Never done   • HEPATITIS C SCREENING  Never done   • Annual Gynecologic Pelvic and Breast Exam  08/05/2022   • INFLUENZA VACCINE  10/01/2022   • PAP SMEAR  08/04/2024   • COLORECTAL CANCER SCREENING  09/23/2031   • COVID-19 Vaccine  Completed   • Pneumococcal Vaccine 0-64  Aged Out       The additional following portions of the patient's history were reviewed and updated as appropriate: allergies, current medications, past family history, past medical history, past social history and past surgical history.    Review of Systems   Constitutional: Negative.    Cardiovascular: Negative.    Gastrointestinal: Negative.    Genitourinary: Negative.    Psychiatric/Behavioral: The patient is nervous/anxious.          I have reviewed and agree with the HPI, ROS, and historical information as entered above. Khadra Curran, APRN      Objective   /64 (BP Location: Right arm, Patient Position: Sitting, Cuff Size: Adult)   Ht 170.2 cm (67.01\")   Wt 60.6 kg (133 lb 9.6 oz)   LMP 05/25/2022 (Approximate)   Breastfeeding " No   BMI 20.92 kg/m²     Physical Exam  Vitals and nursing note reviewed. Exam conducted with a chaperone present.   Constitutional:       Appearance: She is well-developed.   HENT:      Head: Normocephalic and atraumatic.   Neck:      Thyroid: No thyroid mass or thyromegaly.   Cardiovascular:      Rate and Rhythm: Normal rate and regular rhythm.      Heart sounds: No murmur heard.  Pulmonary:      Effort: Pulmonary effort is normal. No retractions.      Breath sounds: Normal breath sounds. No wheezing, rhonchi or rales.   Chest:   Breasts:      Right: Absent.      Left: Absent.        Comments: Bilateral mastectomies due to h/o breast cancer  Abdominal:      Palpations: Abdomen is soft. Abdomen is not rigid. There is no mass.      Tenderness: There is no abdominal tenderness. There is no guarding.      Hernia: No hernia is present.   Genitourinary:     General: Normal vulva.      Labia:         Right: No rash, tenderness or lesion.         Left: No rash, tenderness or lesion.       Vagina: Normal. No vaginal discharge or lesions.      Cervix: No cervical motion tenderness, discharge, lesion or cervical bleeding.      Uterus: Normal. Not enlarged, not fixed and not tender.       Adnexa: Right adnexa normal and left adnexa normal.        Right: No mass or tenderness.          Left: No mass or tenderness.        Rectum: Normal. No external hemorrhoid.   Musculoskeletal:      Cervical back: Normal range of motion. No muscular tenderness.   Neurological:      Mental Status: She is alert and oriented to person, place, and time.   Psychiatric:         Behavior: Behavior normal.            Assessment and Plan    Problem List Items Addressed This Visit    None     Visit Diagnoses     Routine gynecological examination    -  Primary    Relevant Orders    LIQUID-BASED PAP SMEAR, [facility] ([facility])    Personal history of breast cancer              1. GYN annual well woman exam.   2. Pap guidelines reviewed.   3. Had  colonoscopy in 2016 and was normal  4. Has had double mastectomy for personal hx of breast cancer in 2019.   5. Reviewed exercise as a preventative health measures.   6. Reccommended Flu Vaccine in Fall of each year.  7. RTC in 1 year or PRN with problems.      Khadra Curran, APRN  08/10/2022

## 2022-09-08 ENCOUNTER — PATIENT MESSAGE (OUTPATIENT)
Dept: OBSTETRICS AND GYNECOLOGY | Facility: CLINIC | Age: 47
End: 2022-09-08

## 2022-09-08 ENCOUNTER — TELEPHONE (OUTPATIENT)
Dept: OBSTETRICS AND GYNECOLOGY | Facility: CLINIC | Age: 47
End: 2022-09-08

## 2022-09-08 NOTE — TELEPHONE ENCOUNTER
Spoke with patient. Patient noticed the note from her last appointment states in ROS that she is anxious/nervous and that she is taking medications for this. Pt states she is not taking any medications for anxiety. Apologized and explained that this may have been entered in error. Pt would like this removed from her note.

## 2022-09-09 LAB — REF LAB TEST METHOD: NORMAL

## (undated) DEVICE — LEX GENERAL BREAST: Brand: MEDLINE INDUSTRIES, INC.

## (undated) DEVICE — SUT SILK 3/0 TIES 18IN A184H

## (undated) DEVICE — 3M™ STERI-STRIP™ REINFORCED ADHESIVE SKIN CLOSURES, R1547, 1/2 IN X 4 IN (12 MM X 100 MM), 6 STRIPS/ENVELOPE: Brand: 3M™ STERI-STRIP™

## (undated) DEVICE — JACKSON-PRATT 100CC BULB RESERVOIR: Brand: CARDINAL HEALTH

## (undated) DEVICE — SUT SILK 2/0 TIES 18IN A185H

## (undated) DEVICE — COVER,LIGHT HANDLE,FLX,1/PK: Brand: MEDLINE INDUSTRIES, INC.

## (undated) DEVICE — DRSNG SURESITE WNDW 4X4.5

## (undated) DEVICE — SUT ETHLN 3/0 PC5 18IN 1893G

## (undated) DEVICE — SKIN AFFIX SURG ADHESIVE 72/CS 0.55ML: Brand: MEDLINE

## (undated) DEVICE — SHEET,DRAPE,40X58,STERILE: Brand: MEDLINE

## (undated) DEVICE — DRSNG WND BORDR/ADHS NONADHR/GZ LF 4X10IN STRL

## (undated) DEVICE — CAUTERY TIP POLISHER: Brand: DEVON

## (undated) DEVICE — ANTIBACTERIAL UNDYED BRAIDED (POLYGLACTIN 910), SYNTHETIC ABSORBABLE SUTURE: Brand: COATED VICRYL

## (undated) DEVICE — JP PERF DRN SIL FLT 10MM FULL: Brand: CARDINAL HEALTH

## (undated) DEVICE — INTRAOPERATIVE COVER KIT, 10 PACK: Brand: SITE-RITE

## (undated) DEVICE — DISH PETRI 3.5IN MD STRL LF

## (undated) DEVICE — GOWN,NON-REINFORCED,SIRUS,SET IN SLV,XL: Brand: MEDLINE

## (undated) DEVICE — DRSNG SURESITE HNDL 4X5

## (undated) DEVICE — DRSNG WND BORDR/ADHS NONADHR/GZ LF 2X2IN STRL

## (undated) DEVICE — SPNG LAP PREWSH SFTPK 18X18IN STRL PK/5

## (undated) DEVICE — SUT SILK 2/0 PS 18IN 1588H

## (undated) DEVICE — GLV SURG BIOGEL LTX PF 7 1/2

## (undated) DEVICE — STCKNT STRL 4X48 IN